# Patient Record
Sex: MALE | Race: BLACK OR AFRICAN AMERICAN | NOT HISPANIC OR LATINO | Employment: UNEMPLOYED | URBAN - METROPOLITAN AREA
[De-identification: names, ages, dates, MRNs, and addresses within clinical notes are randomized per-mention and may not be internally consistent; named-entity substitution may affect disease eponyms.]

---

## 2017-05-15 ENCOUNTER — HISTORICAL (OUTPATIENT)
Dept: RADIOLOGY | Facility: HOSPITAL | Age: 47
End: 2017-05-15

## 2017-09-26 ENCOUNTER — HISTORICAL (OUTPATIENT)
Dept: NEUROSURGERY | Facility: CLINIC | Age: 47
End: 2017-09-26

## 2017-09-26 ENCOUNTER — HISTORICAL (OUTPATIENT)
Dept: ADMINISTRATIVE | Facility: HOSPITAL | Age: 47
End: 2017-09-26

## 2017-09-26 LAB
ABS NEUT (OLG): 1.04 X10(3)/MCL (ref 2.1–9.2)
BUN SERPL-MCNC: 12 MG/DL (ref 7–18)
CALCIUM SERPL-MCNC: 9.3 MG/DL (ref 8.5–10.1)
CHLORIDE SERPL-SCNC: 101 MMOL/L (ref 98–107)
CO2 SERPL-SCNC: 28 MMOL/L (ref 21–32)
CREAT SERPL-MCNC: 1.11 MG/DL (ref 0.7–1.3)
ERYTHROCYTE [DISTWIDTH] IN BLOOD BY AUTOMATED COUNT: 14.8 % (ref 11.5–17)
GLUCOSE SERPL-MCNC: 100 MG/DL (ref 74–106)
HCT VFR BLD AUTO: 46.9 % (ref 42–52)
HGB BLD-MCNC: 15.2 GM/DL (ref 14–18)
INR PPP: 1.24 (ref 0–1.27)
LYMPHOCYTES NFR BLD MANUAL: 43 % (ref 13–40)
LYMPHOCYTES NFR BLD MANUAL: 8 %
MCH RBC QN AUTO: 27.5 PG (ref 27–31)
MCHC RBC AUTO-ENTMCNC: 32.4 GM/DL (ref 33–36)
MCV RBC AUTO: 85 FL (ref 80–94)
MONOCYTES NFR BLD MANUAL: 13 % (ref 2–11)
NEUTROPHILS NFR BLD MANUAL: 36 % (ref 47–80)
OVALOCYTES BLD QL SMEAR: 1
PLATELET # BLD AUTO: 167 X10(3)/MCL (ref 130–400)
PLATELET # BLD EST: NORMAL 10*3/UL
PMV BLD AUTO: 10.8 FL (ref 7.4–10.4)
POIKILOCYTOSIS BLD QL SMEAR: 1
POTASSIUM SERPL-SCNC: 4.4 MMOL/L (ref 3.5–5.1)
PROTHROMBIN TIME: 15.4 SECOND(S) (ref 12.2–14.7)
RBC # BLD AUTO: 5.52 X10(6)/MCL (ref 4.7–6.1)
RBC MORPH BLD: ABNORMAL
SCHISTOCYTES BLD QL AUTO: 1
SODIUM SERPL-SCNC: 139 MMOL/L (ref 136–145)
WBC # SPEC AUTO: 3 X10(3)/MCL (ref 4.5–11.5)

## 2017-10-10 ENCOUNTER — HISTORICAL (OUTPATIENT)
Dept: ADMINISTRATIVE | Facility: HOSPITAL | Age: 47
End: 2017-10-10

## 2017-10-10 ENCOUNTER — HISTORICAL (OUTPATIENT)
Dept: NEUROSURGERY | Facility: CLINIC | Age: 47
End: 2017-10-10

## 2017-10-10 LAB — APTT PPP: 32.8 SECOND(S) (ref 24.8–36.9)

## 2017-10-17 ENCOUNTER — HISTORICAL (OUTPATIENT)
Dept: ADMINISTRATIVE | Facility: HOSPITAL | Age: 47
End: 2017-10-17

## 2017-10-17 ENCOUNTER — HISTORICAL (OUTPATIENT)
Dept: NEUROSURGERY | Facility: CLINIC | Age: 47
End: 2017-10-17

## 2017-10-17 LAB
BUN SERPL-MCNC: 15 MG/DL (ref 7–18)
CALCIUM SERPL-MCNC: 9.1 MG/DL (ref 8.5–10.1)
CHLORIDE SERPL-SCNC: 102 MMOL/L (ref 98–107)
CO2 SERPL-SCNC: 27 MMOL/L (ref 21–32)
CREAT SERPL-MCNC: 1.05 MG/DL (ref 0.7–1.3)
GLUCOSE SERPL-MCNC: 91 MG/DL (ref 74–106)
POTASSIUM SERPL-SCNC: 4.3 MMOL/L (ref 3.5–5.1)
SODIUM SERPL-SCNC: 138 MMOL/L (ref 136–145)

## 2019-10-18 ENCOUNTER — HOSPITAL ENCOUNTER (OUTPATIENT)
Dept: NUTRITION | Facility: HOSPITAL | Age: 49
End: 2019-10-20
Attending: INTERNAL MEDICINE | Admitting: INTERNAL MEDICINE

## 2019-10-18 LAB
ABS NEUT (OLG): 2.87 X10(3)/MCL (ref 2.1–9.2)
ALBUMIN SERPL-MCNC: 3.8 GM/DL (ref 3.4–5)
ALBUMIN/GLOB SERPL: 1 RATIO (ref 1.1–2)
ALP SERPL-CCNC: 93 UNIT/L (ref 50–136)
ALT SERPL-CCNC: 32 UNIT/L (ref 12–78)
AST SERPL-CCNC: 24 UNIT/L (ref 15–37)
BASOPHILS # BLD AUTO: 0 X10(3)/MCL (ref 0–0.2)
BASOPHILS NFR BLD AUTO: 0 %
BILIRUB SERPL-MCNC: 1.1 MG/DL (ref 0.2–1)
BILIRUBIN DIRECT+TOT PNL SERPL-MCNC: 0.2 MG/DL (ref 0–0.5)
BILIRUBIN DIRECT+TOT PNL SERPL-MCNC: 0.9 MG/DL (ref 0–0.8)
BUN SERPL-MCNC: 41 MG/DL (ref 7–18)
CALCIUM SERPL-MCNC: 9.5 MG/DL (ref 8.5–10.1)
CHLORIDE SERPL-SCNC: 100 MMOL/L (ref 98–107)
CK SERPL-CCNC: 179 UNIT/L (ref 39–308)
CO2 SERPL-SCNC: 23 MMOL/L (ref 21–32)
CREAT SERPL-MCNC: 4.03 MG/DL (ref 0.7–1.3)
EOSINOPHIL # BLD AUTO: 0 X10(3)/MCL (ref 0–0.9)
EOSINOPHIL NFR BLD AUTO: 0 %
ERYTHROCYTE [DISTWIDTH] IN BLOOD BY AUTOMATED COUNT: 14 % (ref 11.5–17)
ETHANOL SERPL-MCNC: <3 MG/DL
GLOBULIN SER-MCNC: 3.8 GM/DL (ref 2.4–3.5)
GLUCOSE SERPL-MCNC: 138 MG/DL (ref 74–106)
HCT VFR BLD AUTO: 36 % (ref 42–52)
HGB BLD-MCNC: 11.2 GM/DL (ref 14–18)
LYMPHOCYTES # BLD AUTO: 0.6 X10(3)/MCL (ref 0.6–4.6)
LYMPHOCYTES NFR BLD AUTO: 15 %
MCH RBC QN AUTO: 27.7 PG (ref 27–31)
MCHC RBC AUTO-ENTMCNC: 31.1 GM/DL (ref 33–36)
MCV RBC AUTO: 88.9 FL (ref 80–94)
MONOCYTES # BLD AUTO: 0.5 X10(3)/MCL (ref 0.1–1.3)
MONOCYTES NFR BLD AUTO: 13 %
NEUTROPHILS # BLD AUTO: 2.87 X10(3)/MCL (ref 2.1–9.2)
NEUTROPHILS NFR BLD AUTO: 71 %
PLATELET # BLD AUTO: 129 X10(3)/MCL (ref 130–400)
PMV BLD AUTO: 11.4 FL (ref 9.4–12.4)
POTASSIUM SERPL-SCNC: 4.3 MMOL/L (ref 3.5–5.1)
PROT SERPL-MCNC: 7.6 GM/DL (ref 6.4–8.2)
RBC # BLD AUTO: 4.05 X10(6)/MCL (ref 4.7–6.1)
RESTICK: NORMAL
SODIUM SERPL-SCNC: 133 MMOL/L (ref 136–145)
TROPONIN I SERPL-MCNC: <0.02 NG/ML (ref 0.02–0.49)
TSH SERPL-ACNC: 1.27 MIU/L (ref 0.36–3.74)
WBC # SPEC AUTO: 4 X10(3)/MCL (ref 4.5–11.5)

## 2019-10-19 LAB
ABS NEUT (OLG): 2.42 X10(3)/MCL (ref 2.1–9.2)
AMPHET UR QL SCN: NEGATIVE
APPEARANCE, UA: CLEAR
BACTERIA SPEC CULT: ABNORMAL /HPF
BARBITURATE SCN PRESENT UR: NEGATIVE
BENZODIAZ UR QL SCN: NEGATIVE
BILIRUB UR QL STRIP: NEGATIVE
BUN SERPL-MCNC: 37 MG/DL (ref 7–18)
CALCIUM SERPL-MCNC: 9 MG/DL (ref 8.5–10.1)
CANNABINOIDS UR QL SCN: NEGATIVE
CHLORIDE SERPL-SCNC: 103 MMOL/L (ref 98–107)
CO2 SERPL-SCNC: 25 MMOL/L (ref 21–32)
COCAINE UR QL SCN: NEGATIVE
COLOR UR: YELLOW
CREAT SERPL-MCNC: 3.1 MG/DL (ref 0.7–1.3)
CREAT/UREA NIT SERPL: 11.9
EOSINOPHIL # BLD AUTO: 0 X10(3)/MCL (ref 0–0.9)
EOSINOPHIL NFR BLD AUTO: 1 %
ERYTHROCYTE [DISTWIDTH] IN BLOOD BY AUTOMATED COUNT: 13.8 % (ref 11.5–17)
GLUCOSE (UA): NEGATIVE
GLUCOSE SERPL-MCNC: 86 MG/DL (ref 74–106)
HCT VFR BLD AUTO: 30.8 % (ref 42–52)
HGB BLD-MCNC: 9.9 GM/DL (ref 14–18)
HGB UR QL STRIP: NEGATIVE
KETONES UR QL STRIP: ABNORMAL
LEUKOCYTE ESTERASE UR QL STRIP: NEGATIVE
LYMPHOCYTES # BLD AUTO: 0.9 X10(3)/MCL (ref 0.6–4.6)
LYMPHOCYTES NFR BLD AUTO: 24 %
MCH RBC QN AUTO: 27.7 PG (ref 27–31)
MCHC RBC AUTO-ENTMCNC: 32.1 GM/DL (ref 33–36)
MCV RBC AUTO: 86 FL (ref 80–94)
MONOCYTES # BLD AUTO: 0.5 X10(3)/MCL (ref 0.1–1.3)
MONOCYTES NFR BLD AUTO: 12 %
NEUTROPHILS # BLD AUTO: 2.42 X10(3)/MCL (ref 2.1–9.2)
NEUTROPHILS NFR BLD AUTO: 63 %
NITRITE UR QL STRIP: NEGATIVE
OPIATES UR QL SCN: NEGATIVE
PCP UR QL: NEGATIVE
PH UR STRIP.AUTO: 6 [PH] (ref 5–7.5)
PH UR STRIP: 6 [PH] (ref 5–9)
PLATELET # BLD AUTO: 157 X10(3)/MCL (ref 130–400)
PMV BLD AUTO: 11.5 FL (ref 9.4–12.4)
POTASSIUM SERPL-SCNC: 3.8 MMOL/L (ref 3.5–5.1)
PROT UR QL STRIP: NEGATIVE
RBC # BLD AUTO: 3.58 X10(6)/MCL (ref 4.7–6.1)
RBC #/AREA URNS HPF: ABNORMAL /[HPF]
SODIUM SERPL-SCNC: 138 MMOL/L (ref 136–145)
SP GR FLD REFRACTOMETRY: 1.01 (ref 1–1.03)
SP GR UR STRIP: 1.01 (ref 1–1.03)
SQUAMOUS EPITHELIAL, UA: ABNORMAL
UROBILINOGEN UR STRIP-ACNC: 0.2
WBC # SPEC AUTO: 3.8 X10(3)/MCL (ref 4.5–11.5)
WBC #/AREA URNS HPF: ABNORMAL /HPF

## 2019-10-20 LAB
BUN SERPL-MCNC: 22 MG/DL (ref 7–18)
CALCIUM SERPL-MCNC: 8.2 MG/DL (ref 8.5–10.1)
CHLORIDE SERPL-SCNC: 110 MMOL/L (ref 98–107)
CO2 SERPL-SCNC: 24 MMOL/L (ref 21–32)
CREAT SERPL-MCNC: 1.92 MG/DL (ref 0.7–1.3)
CREAT/UREA NIT SERPL: 11.5
GLUCOSE SERPL-MCNC: 90 MG/DL (ref 74–106)
POTASSIUM SERPL-SCNC: 3.8 MMOL/L (ref 3.5–5.1)
SODIUM SERPL-SCNC: 141 MMOL/L (ref 136–145)

## 2020-12-29 ENCOUNTER — HISTORICAL (OUTPATIENT)
Dept: RADIOLOGY | Facility: HOSPITAL | Age: 50
End: 2020-12-29

## 2021-09-27 ENCOUNTER — HOSPITAL ENCOUNTER (OUTPATIENT)
Dept: NUTRITION | Facility: HOSPITAL | Age: 51
End: 2021-09-29
Attending: INTERNAL MEDICINE | Admitting: INTERNAL MEDICINE

## 2021-09-27 LAB
ABS NEUT (OLG): 2.34 X10(3)/MCL (ref 2.1–9.2)
ALBUMIN SERPL-MCNC: 3.9 GM/DL (ref 3.5–5)
ALBUMIN/GLOB SERPL: 1.3 RATIO (ref 1.1–2)
ALP SERPL-CCNC: 78 UNIT/L (ref 40–150)
ALT SERPL-CCNC: 37 UNIT/L (ref 0–55)
APPEARANCE, UA: CLEAR
AST SERPL-CCNC: 28 UNIT/L (ref 5–34)
BACTERIA SPEC CULT: NORMAL /HPF
BASOPHILS # BLD AUTO: 0 X10(3)/MCL (ref 0–0.2)
BASOPHILS NFR BLD AUTO: 0 %
BILIRUB SERPL-MCNC: 0.7 MG/DL
BILIRUB UR QL STRIP: NEGATIVE
BILIRUBIN DIRECT+TOT PNL SERPL-MCNC: 0.3 MG/DL (ref 0–0.5)
BILIRUBIN DIRECT+TOT PNL SERPL-MCNC: 0.4 MG/DL (ref 0–0.8)
BUN SERPL-MCNC: 9.2 MG/DL (ref 8.4–25.7)
BUN SERPL-MCNC: 9.4 MG/DL (ref 8.4–25.7)
CALCIUM SERPL-MCNC: 9 MG/DL (ref 8.4–10.2)
CALCIUM SERPL-MCNC: 9.5 MG/DL (ref 8.4–10.2)
CHLORIDE SERPL-SCNC: 89 MMOL/L (ref 98–107)
CHLORIDE SERPL-SCNC: 94 MMOL/L (ref 98–107)
CO2 SERPL-SCNC: 22 MMOL/L (ref 22–29)
CO2 SERPL-SCNC: 25 MMOL/L (ref 22–29)
COLOR UR: YELLOW
CREAT SERPL-MCNC: 0.9 MG/DL (ref 0.73–1.18)
CREAT SERPL-MCNC: 1.2 MG/DL (ref 0.73–1.18)
CREAT/UREA NIT SERPL: 10
EOSINOPHIL # BLD AUTO: 0 X10(3)/MCL (ref 0–0.9)
EOSINOPHIL NFR BLD AUTO: 0 %
ERYTHROCYTE [DISTWIDTH] IN BLOOD BY AUTOMATED COUNT: 14.4 % (ref 11.5–17)
GLOBULIN SER-MCNC: 3.1 GM/DL (ref 2.4–3.5)
GLUCOSE (UA): NEGATIVE
GLUCOSE SERPL-MCNC: 118 MG/DL (ref 74–100)
GLUCOSE SERPL-MCNC: 96 MG/DL (ref 74–100)
HCT VFR BLD AUTO: 32.2 % (ref 42–52)
HGB BLD-MCNC: 10.5 GM/DL (ref 14–18)
HGB UR QL STRIP: NEGATIVE
KETONES UR QL STRIP: NEGATIVE
LEUKOCYTE ESTERASE UR QL STRIP: NEGATIVE
LYMPHOCYTES # BLD AUTO: 0.7 X10(3)/MCL (ref 0.6–4.6)
LYMPHOCYTES NFR BLD AUTO: 20 %
MCH RBC QN AUTO: 26.6 PG (ref 27–31)
MCHC RBC AUTO-ENTMCNC: 32.6 GM/DL (ref 33–36)
MCV RBC AUTO: 81.7 FL (ref 80–94)
MONOCYTES # BLD AUTO: 0.3 X10(3)/MCL (ref 0.1–1.3)
MONOCYTES NFR BLD AUTO: 10 %
NEUTROPHILS # BLD AUTO: 2.34 X10(3)/MCL (ref 2.1–9.2)
NEUTROPHILS NFR BLD AUTO: 69 %
NITRITE UR QL STRIP: NEGATIVE
OSMOLALITY SERPL: 259 MOSM/KG (ref 280–300)
OSMOLALITY UR: 144 MOSM/KG (ref 300–1300)
PH UR STRIP: 7 [PH] (ref 5–9)
PLATELET # BLD AUTO: 175 X10(3)/MCL (ref 130–400)
PMV BLD AUTO: 11.5 FL (ref 9.4–12.4)
POTASSIUM SERPL-SCNC: 3.3 MMOL/L (ref 3.5–5.1)
POTASSIUM SERPL-SCNC: 3.8 MMOL/L (ref 3.5–5.1)
PROT SERPL-MCNC: 7 GM/DL (ref 6.4–8.3)
PROT UR QL STRIP: NEGATIVE
RBC # BLD AUTO: 3.94 X10(6)/MCL (ref 4.7–6.1)
RBC #/AREA URNS HPF: NORMAL /[HPF]
SARS-COV-2 AG RESP QL IA.RAPID: NEGATIVE
SODIUM SERPL-SCNC: 125 MMOL/L (ref 136–145)
SODIUM SERPL-SCNC: 129 MMOL/L (ref 136–145)
SODIUM SERPL-SCNC: 130 MMOL/L (ref 136–145)
SODIUM UR-SCNC: 28 MMOL/L
SP GR UR STRIP: 1 (ref 1–1.03)
SQUAMOUS EPITHELIAL, UA: NORMAL /HPF (ref 0–4)
UROBILINOGEN UR STRIP-ACNC: 1
WBC # SPEC AUTO: 3.4 X10(3)/MCL (ref 4.5–11.5)
WBC #/AREA URNS HPF: NORMAL /HPF

## 2021-09-28 LAB
BUN SERPL-MCNC: 9.4 MG/DL (ref 8.4–25.7)
CALCIUM SERPL-MCNC: 9.7 MG/DL (ref 8.4–10.2)
CHLORIDE SERPL-SCNC: 97 MMOL/L (ref 98–107)
CO2 SERPL-SCNC: 27 MMOL/L (ref 22–29)
CREAT SERPL-MCNC: 1.22 MG/DL (ref 0.73–1.18)
CREAT/UREA NIT SERPL: 8
ERYTHROCYTE [DISTWIDTH] IN BLOOD BY AUTOMATED COUNT: 14.7 % (ref 11.5–17)
GLUCOSE SERPL-MCNC: 111 MG/DL (ref 74–100)
HCT VFR BLD AUTO: 30.2 % (ref 42–52)
HGB BLD-MCNC: 10 GM/DL (ref 14–18)
MCH RBC QN AUTO: 26.7 PG (ref 27–31)
MCHC RBC AUTO-ENTMCNC: 33.1 GM/DL (ref 33–36)
MCV RBC AUTO: 80.7 FL (ref 80–94)
PLATELET # BLD AUTO: 198 X10(3)/MCL (ref 130–400)
PMV BLD AUTO: 11.4 FL (ref 9.4–12.4)
POTASSIUM SERPL-SCNC: 4.8 MMOL/L (ref 3.5–5.1)
RBC # BLD AUTO: 3.74 X10(6)/MCL (ref 4.7–6.1)
SODIUM SERPL-SCNC: 133 MMOL/L (ref 136–145)
WBC # SPEC AUTO: 3.4 X10(3)/MCL (ref 4.5–11.5)

## 2021-09-29 LAB
BUN SERPL-MCNC: 8.8 MG/DL (ref 8.4–25.7)
CALCIUM SERPL-MCNC: 9.9 MG/DL (ref 8.4–10.2)
CHLORIDE SERPL-SCNC: 98 MMOL/L (ref 98–107)
CO2 SERPL-SCNC: 25 MMOL/L (ref 22–29)
CREAT SERPL-MCNC: 1.06 MG/DL (ref 0.73–1.18)
CREAT/UREA NIT SERPL: 8
GLUCOSE SERPL-MCNC: 111 MG/DL (ref 74–100)
POTASSIUM SERPL-SCNC: 4.5 MMOL/L (ref 3.5–5.1)
SODIUM SERPL-SCNC: 136 MMOL/L (ref 136–145)

## 2021-11-18 ENCOUNTER — HOSPITAL ENCOUNTER (OUTPATIENT)
Dept: TELEMEDICINE | Facility: HOSPITAL | Age: 51
Discharge: HOME | End: 2021-11-18

## 2021-11-18 DIAGNOSIS — G93.40 ENCEPHALOPATHY: ICD-10-CM

## 2021-11-18 PROCEDURE — G0425 INPT/ED TELECONSULT30: HCPCS | Mod: GT,,, | Performed by: PSYCHIATRY & NEUROLOGY

## 2021-11-18 PROCEDURE — G0425 PR INPT TELEHEALTH CONSULT 30M: ICD-10-PCS | Mod: GT,,, | Performed by: PSYCHIATRY & NEUROLOGY

## 2022-03-11 ENCOUNTER — HISTORICAL (OUTPATIENT)
Dept: ADMINISTRATIVE | Facility: HOSPITAL | Age: 52
End: 2022-03-11

## 2022-03-14 ENCOUNTER — HISTORICAL (OUTPATIENT)
Dept: ADMINISTRATIVE | Facility: HOSPITAL | Age: 52
End: 2022-03-14

## 2022-03-14 LAB — SARS-COV-2 AG RESP QL IA.RAPID: NEGATIVE

## 2022-03-16 ENCOUNTER — HISTORICAL (OUTPATIENT)
Dept: SURGERY | Facility: HOSPITAL | Age: 52
End: 2022-03-16

## 2022-04-11 ENCOUNTER — HISTORICAL (OUTPATIENT)
Dept: ADMINISTRATIVE | Facility: HOSPITAL | Age: 52
End: 2022-04-11
Payer: MEDICARE

## 2022-04-25 VITALS
HEIGHT: 68 IN | DIASTOLIC BLOOD PRESSURE: 90 MMHG | BODY MASS INDEX: 26.66 KG/M2 | WEIGHT: 175.94 LBS | SYSTOLIC BLOOD PRESSURE: 128 MMHG | OXYGEN SATURATION: 100 %

## 2022-04-30 NOTE — OP NOTE
DATE OF SURGERY:    09/26/2017    SURGEON:  Ramesh Stanley MD    PREOPERATIVE DIAGNOSIS:  Cervicalgia, dystonia.    POSTOPERATIVE DIAGNOSIS:  Cervicalgia, dystonia.    PROCEDURE:  Fluoroscopically guided intralaminar cervical epidural injection at C3-4.        Equipment used:  Epidural tray.    PROCEDURE IN DETAIL:  Following informed consent, and with the patient under general anesthesia, he was prepped and draped in the usual sterile fashion.  I infiltrated the tissue overlying C3-4 with local anesthetic.  Under fluoroscopic guidance, I advanced a 25-gauge 3.5 inch BD spinal needle into the epidural space.  Positioning was confirmed with administration of contrast.  I then instilled a combination of 160 mg Depo-Medrol and 1 mL of 5% dextrose in water.  I removed the needle and applied a sterile dressing.  The patient tolerated the procedure well without apparent complication.    IMPRESSION:  Successful fluoroscopically guided intralaminar cervical epidural injection at C3-4.        ______________________________  MD SABRINA Young/ASAD  DD:  09/26/2017  Time:  08:48AM  DT:  09/26/2017  Time:  12:07PM  Job #:  [id number]

## 2022-04-30 NOTE — ED PROVIDER NOTES
Patient:   Larry Riggs            MRN: [id number]            FIN: [id number]               Age:   48 years     Sex:  Male     :  1970   Associated Diagnoses:   Syncope; Dehydration; Acute hypotension   Author:   Tiburcio Bro MD      Basic Information   Time seen: Date & time 10/18/2019 18:30:00.   History source: Patient.   Arrival mode: Ambulance.   History limitation: None.   Provider/Visit info:    No qualifying data available.   .   History of Present Illness   The patient presents with 49Y/O M presents to the ED with syncope found on the ground outside. Hypotensive on scene. C-collar in place upon arrival .  Kaye GREENE and     I, Dr. Bro, assumed care of this patient at 1838.    49 y/o AAM presents to the ED via EMS after suffering a syncopal episode PTA. Pt reports that the last thing that he remembers prior to falling was walking outside and suddenly becoming lightheaded. He states that he had a previous episode 5 years ago. Pt states that he felt fine prior to the fall and denies any HA, neck pain, or fever. Currently on HTN medication. Hypotensive on scene. .  The onset was just prior to arrival.  The course/duration of symptoms is constant.  The location where the incident occurred was at home.  The exacerbating factor is none.  The relieving factor is none.  Risk factors consist of hypertension.  Prior episodes: Previous episode five years ago.  Therapy today: emergency medical services.  Preceding symptoms: lightheaded.  Associated symptoms: denies headache.  Associated injury to the none.  Additional history: none.        Review of Systems   Constitutional symptoms:  No fever, no chills, no sweats, no weakness, no fatigue, no decreased activity.    Skin symptoms:  No jaundice, no rash, no pruritus, no abrasions, no breakdown, no burns, no dryness, no petechiae, no lesion.    ENMT symptoms:  No ear pain, no sore throat, no nasal congestion, no sinus pain.     Respiratory symptoms:  No shortness of breath, no orthopnea, no cough, no hemoptysis, no stridor, no wheezing.    Cardiovascular symptoms:  Syncope, no chest pain, no palpitations, no tachycardia, no diaphoresis, no peripheral edema.    Gastrointestinal symptoms:  No abdominal pain, no nausea, no vomiting.    Genitourinary symptoms:  No dysuria, no hematuria.    Musculoskeletal symptoms:  Denies neck pain, no back pain, no Muscle pain, no Joint pain, no Claudication.    Neurologic symptoms:  No headache, no dizziness, no altered level of consciousness, no numbness, no tingling, no weakness.              Additional review of systems information: All systems reviewed as documented in chart.      Health Status   Allergies:    Allergic Reactions (Selected)  No Known Allergies.   Medications: Per nurse's notes.      Past Medical/ Family/ Social History   Medical history:    Active  Anxiety ([id number]): Onset on 10/29/2013 at 42 years.  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  bulging cervical disc ([id number]):  Resolved.  Hypercholesterolemia (33793045):  Resolved.  Hypertension (83339700):  Resolved.  Acid reflux ([id number]):  Resolved.  Depression (457154405):  Resolved..   Surgical history:    Injection Cervical Epidural Steroid (.) on 10/17/2017 at 46 Years.  Comments:  10/17/2017 10:33 Jammie Schulz  auto-populated from documented surgical case  Injection Cervical Epidural Steroid (., Posterior) on 10/10/2017 at 46 Years.  Comments:  10/10/2017 9:05 Briseyda Craft RN  auto-populated from documented surgical case  Injection Cervical Epidural Steroid (.) on 9/26/2017 at 46 Years.  Comments:  9/26/2017 9:09 Jammie Schulz  auto-populated from documented surgical case  left knee ACL reconstruction.  right ankle surgery.  Cervical vertebral fusion ([id number]).  colonoscopy - 11/2015..   Family history:    Hypertension  Mother  Father  Diabetes mellitus type 1  Mother  .    Social history: Alcohol use: Quit 1.5 years ago, Tobacco use: Denies, Drug use: Denies, Occupation: On disability.      Physical Examination               Vital Signs   Vital Signs   10/18/2019 18:35 CDT     Peripheral Pulse Rate     82 bpm                             Respiratory Rate          18 br/min                             SpO2                      100 %                             Oxygen Therapy            Room air                             Systolic Blood Pressure   71 mmHg  LOW                             Diastolic Blood Pressure  49 mmHg  LOW                             Mean Arterial Pressure, Cuff              56 mmHg  .   Measurements   10/18/2019 18:29 CDT     Weight Dosing             81.7 kg                             Weight Measured and Calculated in Lbs     180.12 lb                             Weight Estimated          81.7 kg                             Height/Length Dosing      172 cm                             Height/Length Estimated   172 cm                             Body Mass Index Estimated 27.62 kg/m2  .   Basic Oxygen Information   10/18/2019 18:35 CDT     SpO2                      100 %                             Oxygen Therapy            Room air  .   General:  Alert, no acute distress.    Skin:  Warm, dry, no rash.    Head:  Normocephalic, atraumatic.    Neck:  Supple, trachea midline, no tenderness, C-collar per EMS that was taken off in room.    Eye:  Extraocular movements are intact, vision unchanged.    Ears, nose, mouth and throat:  Dry oral mucosa.   Respiratory:  Lungs are clear to auscultation, respirations are non-labored, breath sounds are equal, Symmetrical chest wall expansion.    Cardiovascular:  Regular rate and rhythm, No murmur, Normal peripheral perfusion.    Gastrointestinal:  Soft, Nontender, Non distended, Normal bowel sounds, No organomegaly.    Musculoskeletal:  Normal ROM, normal strength.    Neurological:  Alert and oriented to person, place, time, and  situation, No focal neurological deficit observed, normal sensory observed, normal motor observed, normal speech observed, normal coordination observed.    Psychiatric:  Cooperative, appropriate mood & affect.       Medical Decision Making   Documents reviewed:  Emergency department nurses' notes.   Orders  Launch Orders   Laboratory:  Troponin-I (Order): Stat collect, 10/18/2019 18:31 CDT, Blood, Lab Collect, Print Label By Order Location, 10/18/2019 18:31 CDT  UA Total a reflex to culture (Order): Stat collect, Urine, 10/18/2019 18:31 CDT, Nurse collect, Print Label By Order Location  CMP (Order): Stat collect, 10/18/2019 18:31 CDT, Blood, Lab Collect, Print Label By Order Location, 10/18/2019 18:31 CDT  CBC w/ Auto Diff (Order): Now collect, 10/18/2019 18:31 CDT, Blood, Lab Collect, Print Label By Order Location, 10/18/2019 18:31 CDT  Pharmacy:  Normal Saline (0.9% NS) IV 1,000 mL (Order): 1,000 mL, 1,000 mL, IV, 999 mL/hr, start date 10/18/2019 18:31 CDT  Radiology:  CT Cervical Spine W/O Contrast (Order): Stat, 10/18/2019 18:31 CDT, Other (please specify), FALL, None, Patient Bed, Patient Has IV?, Rad Type, Schedule this test  CT Head W/O Contrast (Order): Stat, 10/18/2019 18:31 CDT, Other (please specify), SYNCOPE, None, Patient Bed, Patient Has IV?, Rad Type, Schedule this test  Cardiology:  EKG (Order): 10/18/2019 18:31 CDT, Stat, Once, 10/18/2019 18:31 CDT, Patient Bed, Patient Has IV, Standard Precautions, -1, -1, 10/18/2019 18:31 CDT.   Electrocardiogram:  Time 10/18/2019 21:31:00, rate 83, normal sinus rhythm, no ectopy, normal AR & QRS intervals, EP Interp, T wave Flattened.    Results review:  Lab results : Lab View   10/18/2019 19:10 CDT     Sodium Lvl                133 mmol/L  LOW                             Potassium Lvl             4.3 mmol/L                             Chloride                  100 mmol/L                             CO2                       23.0 mmol/L                              Calcium Lvl               9.5 mg/dL                             Glucose Lvl               138 mg/dL  HI                             BUN                       41.0 mg/dL  HI                             Creatinine                4.03 mg/dL  HI                             eGFR-AA                   20 mL/min/1.73 m2  NA                             eGFR-REINALDO                  17 mL/min/1.73 m2  NA                             Bili Total                1.1 mg/dL  HI                             Bili Direct               0.20 mg/dL                             Bili Indirect             0.90 mg/dL  HI                             AST                       24 unit/L                             ALT                       32 unit/L                             Alk Phos                  93 unit/L                             Total Protein             7.6 gm/dL                             Albumin Lvl               3.80 gm/dL                             Globulin                  3.80 gm/dL  HI                             A/G Ratio                 1.0 ratio  LOW                             Troponin-I                <0.02 ng/mL                             TSH                       1.270 mIU/L                             WBC                       4.0 x10(3)/mcL  LOW                             RBC                       4.05 x10(6)/mcL  LOW                             Hgb                       11.2 gm/dL  LOW                             Hct                       36.0 %  LOW                             Platelet                  129 x10(3)/mcL  LOW                             MCV                       88.9 fL                             MCH                       27.7 pg                             MCHC                      31.1 gm/dL  LOW                             RDW                       14.0 %                             MPV                       11.4 fL                             Abs Neut                  2.87 x10(3)/mcL                              Neutro Auto               71 %  NA                             Lymph Auto                15 %  NA                             Mono Auto                 13 %  NA                             Eos Auto                  0 %  NA                             Abs Eos                   0.0 x10(3)/mcL                             Basophil Auto             0 %  NA                             Abs Neutro                2.87 x10(3)/mcL                             Abs Lymph                 0.6 x10(3)/mcL                             Abs Mono                  0.5 x10(3)/mcL                             Abs Baso                  0.0 x10(3)/mcL                             Ethanol Lvl               <3.0 mg/dL  NA  .   Radiology results:  Emergency physician interpretation: No acute changes seen on chest x-ray, Rad Results (ST)  < 12 hrs   Accession: [id number]  Order: CT Cervical Spine W/O Contrast  Report Dt/Tm: 10/18/2019 20:39  Report:   EXAMINATION: CT of the head and cervical spine without contrast     EXAMINATION DATE: 10/18/2019     COMPARISON: 3/28/2019     TECHNIQUE: Multiple cross-sectional images of the head and cervical  spine were obtained without the use of contrast. Coronal and sagittal  reformatted images were obtained.     CLINICAL HISTORY: Fall     FINDINGS:     Head:     No acute ischemic changes or infarct. No intraparenchymal hemorrhage  or contusion. No mass, mass effect, midline shift, edema, or  extra-axial fluid. The gray-white matter differentiation maintained.  The cerebral sulci and basal cisterns are normal. No hydrocephalus.  Hypodensities the periventricular deep cortical white matter  consistent with nonspecific small vessel ischemia.     The globes are intact. The paranasal sinuses and mastoid air cells are  well pneumatized.     Cervical spine:     Straightening of the normal lordotic curvature. The alignment is  normal. Status post anterior fusion at C4-C5 and C5-C6. No definitive  osseous  union is identified. There is been prior discectomy at C4-C5  and C5-C6. The vertebral heights are maintained. No compression  deformity, fracture, or subluxation. No significant intervertebral  disc space narrowing is identified excluded at C6-C7. The predental  space and prevertebral soft tissues are normal. Minimal central canal  stenosis is identified C4-C5 and C5-C6 as well as C6-C7. Uncovertebral  facet arthropathy is noted without significant neural foraminal  narrowing.     The visualized soft tissues of the neck are normal. The lung apices  are clear.     IMPRESSION:    1. No acute intracranial adenopathy. Findings consistent with  microangiopathy.  2. Spondylotic changes of the cervical spine as above without  fracture. Status post fusion at C4-C5 and C5-C6.      .       Reexamination/ Reevaluation   Time: 10/18/2019 21:27:00 .   Vital signs   results included from flowsheet : Vital Signs   10/18/2019 20:30 CDT     Peripheral Pulse Rate     79 bpm                             Heart Rate Monitored      79 bpm                             Respiratory Rate          13 br/min                             SpO2                      99 %                             Oxygen Therapy            Room air                             Systolic Blood Pressure   100 mmHg                             Diastolic Blood Pressure  71 mmHg                             Mean Arterial Pressure, Cuff              81 mmHg     Course: progressing as expected, well controlled, Patient is alert and oriented, he is in no apparent distress.  No complaints.  Patient states he is feeling better..      Procedure   Critical care note   Total time: 45 minutes spent engaged in work directly related to patient care and/ or available for direct patient care.   Critical condition(s) addressed for impending deterioration include: cardiovascular, metabolic, renal.   Associated risk factors: hypotension, dysrhythmia, metabolic changes, dehydration.    Management: bedside assessment, supervision of care, Interpretation (chest x-ray, electrocardiogram, blood pressure, cardiac output measures), Interventions hemodynamic management, Case review primary care physician.   Treatment response: Blood pressure is improving after fluid bolus.  Patient states he is feeling better..   Performed by: self.      Impression and Plan   Diagnosis   Syncope (PUW19-BR R55)   Dehydration (VJE50-YI E86.0)   Acute hypotension (GRK33-OR I95.9)      Calls-Consults   -  10/18/2019 21:29:00 , Fernanda GIRALDO.    Plan   Condition: Stable.    Disposition: Admit time  10/18/2019 21:29:00.    Counseled: Patient, Regarding diagnosis, Regarding diagnostic results, Regarding treatment plan, Patient indicated understanding of instructions.    Notes: I, Yousif Lopez, acted solely as a scribe for and in the presence of Dr. Bro who performed the service. , I acknowledged that the documentation on this chart was provided by a scribe on the date of service noted above and that the documentation in the chart accurately reflects work and decisions made by me alone..

## 2022-04-30 NOTE — OP NOTE
DATE OF SURGERY:    10/17/2017    SURGEON:  Ramesh Stanley MD    PREOPERATIVE DIAGNOSIS:  Cervical radiculopathy.    POSTOPERATIVE DIAGNOSIS:  Cervical radiculopathy.    PROCEDURE:  Fluoroscopically guided interlaminar cervical epidural injection at C3-4.    EQUIPMENT USED:  Epidural tray.    DETAILED DESCRIPTION:  Following informed consent, the patient was prepped and draped in the usual sterile fashion.  I infiltrated the tissue overlying C3-4 with local anesthetic.  Under fluoroscopic guidance, I advanced a 25 gauge 3.5 inch BD spinal needle into the epidural space.  Positioning was confirmed with administration of contrast.  I then instilled a combination of 160 mg Depo-Medrol and 1 ml of 5% dextrose in water.  I removed the needle and applied sterile dressing.  The patient tolerated the procedure well without apparent complication.    IMPRESSION:  Successful fluoroscopically guided interlaminar cervical epidural injection at C3-4.        ______________________________  MD SABRINA Young/LEVI  DD:  10/17/2017  Time:  09:20AM  DT:  10/17/2017  Time:  02:15PM  Job #:  [id number]

## 2022-04-30 NOTE — ED PROVIDER NOTES
Patient:   Larry Riggs            MRN: [id number]            FIN: [id number]               Age:   50 years     Sex:  Male     :  1970   Associated Diagnoses:   Generalized weakness; Acute hyponatremia   Author:   Michelle Moore MD      Basic Information   Time seen: Date & time 2021 09:12:00.   History source: Patient, EMS.   Arrival mode: Ambulance.   History limitation: None.   Additional information: Chief Complaint from Nursing Triage Note : Chief Complaint   2021 9:08 CDT       Chief Complaint           Dizziness and confusion x3 days. [facility]. Na: 119 on labs 3 days ago.  .      History of Present Illness   The patient presents with 50 year old male presents to ED, via EMS, complaining of dizziness.  Pt was seen at [facility] today and then sent to ED.  He reports feeling dizzy and lightheaded for the last few days, and had a fall last week.  Pt also reports losing about 10 pounds without trying recently, because he has no appetite.  Pt says that he did begin a new medication recently, but he can't remember what it is.  Pt denies any fever, CP, SOB, N/V..  The onset was 3  days ago.  The course/duration of symptoms is constant.  The character of symptoms is lightheaded.  The degree at present is moderate.  The exacerbating factor is none.  The relieving factor is none.  Therapy today: see nurses notes.  Associated symptoms: denies chest pain, denies nausea, denies vomiting and denies shortness of breath.        Review of Systems   Constitutional symptoms:  No fever, no chills, no sweatsWeight loss: 10 pounds.   Skin symptoms:  No rash, no petechiae.    Eye symptoms:  Vision unchangedNo pain, no discharge, no icterus, no diplopia, no blurred vision, no blindness.    ENMT symptoms:  No ear pain, no sore throat, no nasal congestion, no sinus pain.    Respiratory symptoms:  No shortness of breath, no orthopnea, no cough, no hemoptysis, no stridor,  no wheezing.    Cardiovascular symptoms:  No chest pain, no palpitations, no syncope, no diaphoresis, no peripheral edema.    Gastrointestinal symptoms:  No abdominal pain, no nausea, no vomiting, no diarrhea, no constipation, no rectal bleeding, no rectal pain.    Genitourinary symptoms:  No dysuria, no hematuria   Musculoskeletal symptoms:  No back pain, no Muscle pain   Neurologic symptoms:  DizzinessNo headache, no altered level of consciousness, no numbness, no tingling.    Psychiatric symptoms:  Negative except as documented in HPI   Endocrine symptoms:  Negative except as documented in HPI.   Hematologic/Lymphatic symptoms:  Negative except as documented in HPI      Health Status   Allergies:    Allergic Reactions (All)  No Known Allergies.   Medications:  (Selected)   Prescriptions  Prescribed  aspirin 81 mg oral tablet: 162 mg = 2 tab(s), Oral, Daily, # 90 tab(s), 0 Refill(s), Pharmacy: [facility]/pharmacy #[id number]  Documented Medications  Documented  PARoxetine 30 mg oral tablet: 60 mg = 2 tab(s), Oral, Daily, # 60 tab(s), 0 Refill(s)  QUEtiapine 100 mg oral tablet: 100 mg = 1 tab(s), Oral, qPM  atorvastatin 80 mg oral tablet: 40 mg = 0.5 tab(s), Oral, Daily, 0 Refill(s)  benztropine 2 mg oral tablet: 2 mg = 1 tab(s), Oral, BID, # 120 tab(s), 0 Refill(s)  busPIRone 10 mg oral tablet: 10 mg = 1 tab(s), Oral, TID  cholecalciferol 2000 intl units oral capsule: 2,000 IntUnit = 1 cap(s), Oral, Daily, 0 Refill(s)  cyproheptadine 4 mg oral tablet: 8 mg = 2 tab(s), Oral, BID, 0 Refill(s)  escitalopram 20 mg oral tablet: 20 mg = 1 tab(s), Oral, Daily, # 30 tab(s), 0 Refill(s)  hydrochlorothiazide-lisinopril 25 mg-20 mg oral tablet: 1 tab(s), Oral, Daily  perphenazine 4 mg oral tablet: 4 mg = 1 tab(s), Oral, BID, 0 Refill(s)  polyethylene glycol 3350 ( MiraLax ): 17 gm, Oral, Daily, dissolve in water before taking, 0 Refill(s)  sildenafil 100 mg oral tablet: 100 mg = 1 tab(s), Oral, Daily, PRN PRN erectile dysfunction, 0  Refill(s)  traZODone 100 mg oral tablet: 100 mg = 1 tab(s), Oral, BID, # 60 tab(s), 0 Refill(s).      Past Medical/ Family/ Social History   Medical history:    Active  Anxiety (30022818): Onset on 10/29/2013 at 42 years.  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  bulging cervical disc (7122235375):  Resolved.  Hypercholesterolemia (18350136):  Resolved.  Hypertension (39251080):  Resolved.  Acid reflux (108269177):  Resolved.  Depression (866252494):  Resolved..   Surgical history:    Injection Cervical Epidural Steroid (.) on 10/17/2017 at 46 Years.  Comments:  10/17/2017 10:33 Jammie Schulz  auto-populated from documented surgical case  Injection Cervical Epidural Steroid (., Posterior) on 10/10/2017 at 46 Years.  Comments:  10/10/2017 9:05 Briseyda Craft RN  auto-populated from documented surgical case  Injection Cervical Epidural Steroid (.) on 9/26/2017 at 46 Years.  Comments:  9/26/2017 9:09 Jammie Schulz  auto-populated from documented surgical case  left knee ACL reconstruction.  right ankle surgery.  Cervical vertebral fusion ([id number]).  colonoscopy - 11/2015..   Family history:    Hypertension  Mother  Father  Diabetes mellitus type 1  Mother  .   Social history:    Social & Psychosocial Habits    Alcohol    Comment: quit 1.5 yrs ago - 08/03/2012 08:27 - Hernan MACHUCA, Suzanna WOMACK    Employment/School  10/29/2014  Status: Disabled    Substance Use  08/27/2018  Use: Past    Tobacco  11/12/2018  Use: Former smoker    Patient Wants Consult For Cessation Counseling N/A    10/18/2019  Use: Former smoker, quit more    Type: Cigarettes    Patient Wants Consult For Cessation Counseling N/A    Started at age: 12 Years    Stopped at age: 38 Years    06/15/2020  Use: Never (less than 100 in l    Patient Wants Consult For Cessation Counseling N/A    Abuse/Neglect  10/18/2019  SHX Any signs of abuse or neglect No    Feels unsafe at home: No    Safe place to go:  Yes    06/15/2020  SHX Any signs of abuse or neglect No  , Occupation: Retired, Family/social situation: Unmarried.   Problem list:    Active Problems (6)  Anxiety   Cervicalgia   Depression   High cholesterol   HTN - Hypertension   Obesity   .      Physical Examination               Vital Signs   Vital Signs   9/27/2021 9:08 CDT       Temperature Oral          36.6 DegC                             Temperature Oral (calculated)             97.88 DegF                             Peripheral Pulse Rate     120 bpm  HI                             Respiratory Rate          20 br/min                             SpO2                      100 %                             Oxygen Therapy            Room air                             Systolic Blood Pressure   105 mmHg                             Diastolic Blood Pressure  81 mmHg  .   Measurements   9/27/2021 9:08 CDT       Weight Dosing             81.6 kg                             Weight Measured and Calculated in Lbs     179.90 lb                             Weight Estimated          81.6 kg                             Height/Length Dosing      172 cm                             Height/Length Estimated   172 cm                             Body Mass Index Estimated 27.58 kg/m2  .   Basic Oxygen Information   9/27/2021 9:08 CDT       SpO2                      100 %                             Oxygen Therapy            Room air  .   General:  Alert, no acute distress   Skin:  Warm, dry, intact   Head:  Normocephalic, atraumatic   Neck:  Supple, trachea midline, no tenderness   Eye:  Pupils are equal, round and reactive to light, extraocular movements are intact   Cardiovascular:  No murmur, Normal peripheral perfusion, Tachycardia   Respiratory:  Lungs are clear to auscultation, respirations are non-labored   Back:  Nontender, Normal range of motion   Musculoskeletal:  Normal ROM, normal strength   Gastrointestinal:  Soft, Nontender   Neurological:  Alert and oriented to  person, place, time, and situation, No focal neurological deficit observed   Psychiatric:  Cooperative, appropriate mood & affect      Medical Decision Making   Differential Diagnosis:  Dizziness, vertigo, generalized weakness, syncope, dysrhythmia, hypertension, hypotension, hyperglycemia, hypoglycemia, dehydration, upper respiratory infection, anemia.    Rationale:  pt here with hyponatremia, symptomatic, worse a couple days ago but still low, given ivf, admit.   Documents reviewed:  Emergency department nurses' notes, prior records.    Orders  Launch Order Profile (Selected)   Inpatient Orders  Ordered  Cardiac Monitorin21 9:17:00 CDT, Constant Order  EK21 9:17:00 CDT, Stat, Once, 21 9:17:00 CDT, Patient Bed, Patient Has IV, Standard Precautions, -1, -1, 21 9:17:00 CDT  Patient Isolation: 21 9:17:45 CDT, Contact Precautions, Constant Indicator  Patient Isolation: 21 9:17:45 CDT, Droplet Precautions, Constant Indicator  Saline Lock Insert: 21 9:17:00 CDT, Stop date 21 9:17:00 CDT  Ordered (Dispatched)  COVID-19  IDnow: Stat collect, Nasal, 21 9:17:00 CDT, Stop date 21 9:17:00 CDT, Nurse collect  Osmolality Urine: Stat collect, Urine, 21 9:17:00 CDT, Stop date 21 9:17:00 CDT, Nurse collect  UA with Reflex: Stat collect, Urine, 21 9:17:00 CDT, Stop date 21 9:17:00 CDT, Nurse collect  Ordered (In-Lab)  Automated Diff: Now collect, 21 9:20:00 CDT, Blood, Collected, Stop date 21 9:20:00 CDT, Lab Collect, Print Label By Order Location, 21 9:17:00 CDT  CBC w/ Auto Diff: Now collect, 21 9:17:00 CDT, Blood, Stop date 21 9:17:00 CDT, Lab Collect, Print Label By Order Location, 21 9:17:00 CDT  CMP: Stat collect, 21 9:17:00 CDT, Blood, Stop date 21 9:17:00 CDT, Lab Collect, Print Label By Order Location, 21 9:17:00 CDT  Osmolality Serum: Stat collect, 21 9:17:00 CDT, Blood, Stop  date 09/27/21 9:17:00 CDT, Lab Collect, Print Label By Order Location, 09/27/21 9:17:00 CDT  Completed  XR Chest 1 View: Stat, 09/27/21 9:19:00 CDT, Cough, None, Patient Bed, Patient Has IV?, Rad Type, Not Scheduled, 09/27/21 9:19:00 CDT.   Cardiac monitor:  Time 9/27/2021 11:25:00, Rate 100, Sinus Tachycardia, for wekaness/lightheadedness.    Electrocardiogram:  Time 9/27/2021 09:45:00, rate 102, No ST-T changes, no ectopy, normal FL & QRS intervals, EP Interp, The Rhythm is sinus tachycardia.  .    Results review:  Lab results : Lab View   9/27/2021 10:04 CDT      Est Creat Clearance Ser   69.00 mL/min    9/27/2021 9:20 CDT       Sodium Lvl                125 mmol/L  LOW                             Potassium Lvl             3.3 mmol/L  LOW                             Chloride                  89 mmol/L  LOW                             CO2                       22 mmol/L                             Calcium Lvl               9.0 mg/dL                             Glucose Lvl               118 mg/dL  HI                             BUN                       9.2 mg/dL                             Creatinine                1.20 mg/dL  HI                             eGFR-AA                   >60  NA                             eGFR-REINALDO                  >60 mL/min/1.73 m2  NA                             Bili Total                0.7 mg/dL                             Bili Direct               0.3 mg/dL                             Bili Indirect             0.40 mg/dL                             AST                       28 unit/L                             ALT                       37 unit/L                             Alk Phos                  78 unit/L                             Total Protein             7.0 gm/dL                             Albumin Lvl               3.9 gm/dL                             Globulin                  3.1 gm/dL                             A/G Ratio                 1.3 ratio                              WBC                       3.4 x10(3)/mcL  LOW                             RBC                       3.94 x10(6)/mcL  LOW                             Hgb                       10.5 gm/dL  LOW                             Hct                       32.2 %  LOW                             Platelet                  175 x10(3)/mcL                             MCV                       81.7 fL                             MCH                       26.6 pg  LOW                             MCHC                      32.6 gm/dL  LOW                             RDW                       14.4 %                             MPV                       11.5 fL                             Abs Neut                  2.34 x10(3)/mcL                             Neutro Auto               69 %  NA                             Lymph Auto                20 %  NA                             Mono Auto                 10 %  NA                             Eos Auto                  0 %  NA                             Abs Eos                   0.0 x10(3)/mcL                             Basophil Auto             0 %  NA                             Abs Neutro                2.34 x10(3)/mcL                             Abs Lymph                 0.7 x10(3)/mcL                             Abs Mono                  0.3 x10(3)/mcL                             Abs Baso                  0.0 x10(3)/mcL  .   Radiology results:  Rad Results (ST)  < 12 hrs   Accession: [id number]  Order: XR Chest 1 View  Report Dt/Tm: 09/27/2021 09:37  Report:   Clinical History  Cough     Technique  Single view of the chest.     Comparison  10/18/2019     Findings  Lungs are clear with no visualized focal airspace opacity.  There is no evidence of pneumothorax or pleural effusion.  The cardiomediastinal silhouette is within normal limits.  No acute osseous abnormality.  The visualized abdomen is unremarkable.     Impression  No acute cardiopulmonary process.      .       Reexamination/ Reevaluation   Time: 9/27/2021 11:15:00 .   Vital signs   Basic Oxygen Information   9/27/2021 9:08 CDT       SpO2                      100 %                             Oxygen Therapy            Room air     Notes: asking for food.      Impression and Plan   Diagnosis   Generalized weakness (AKI81-YK R53.1)   Acute hyponatremia (PTZ20-PH E87.1)      Calls-Consults   -  9/27/2021 10:37:00 , hospitalist.    Plan   Condition: Improved.    Disposition: Admit time  9/27/2021 10:37:00, Admit to Inpatient Unit.    Counseled: Patient, Regarding diagnosis, Regarding diagnostic results, Regarding treatment plan, Patient indicated understanding of instructions.    Notes: I, Amanda Emmanuel, acted solely as a scribe for and in the presence of Dr. Moore who performed the service..       Addendum   I, Michelle Moore MD, performed the history, physical examination, MDM and procedures as above and agree with the scribe's documentation

## 2022-04-30 NOTE — OP NOTE
DATE OF SURGERY:    10/10/2017    SURGEON:  Ramesh Stanley MD    PREOPERATIVE DIAGNOSIS:  Cervical radiculopathy.    POSTOPERATIVE DIAGNOSIS:  Cervical radiculopathy.    PROCEDURE:  Fluoroscopically guided intralaminar cervical epidural injection at C3-4.     Equipment used:  Epidural tray.    PROCEDURE IN DETAIL:  Following informed consent, the patient was prepped and draped in the usual sterile fashion.  I infiltrated the tissue overlying C3-4 with local anesthetic.  Under fluoroscopic guidance, I advanced a 25-gauge 3.5 inch BD spinal needle into the epidural space.  Positioning was confirmed with administration of contrast.  I then instilled a combination of 16 mg Depo-Medrol and 1 mL of 5% dextrose in water.  I removed the needle and applied a sterile dressing.  The patient tolerated the procedure well without apparent complication.    IMPRESSION:  Successful fluoroscopically guided intralaminar cervical epidural injection at C3-4.        ______________________________  MD SABRINA Young/ASAD  DD:  10/10/2017  Time:  08:44AM  DT:  10/10/2017  Time:  04:23PM  Job #:  [id number]

## 2022-05-04 DIAGNOSIS — G24.3 CERVICAL DYSTONIA: Primary | ICD-10-CM

## 2022-05-14 NOTE — H&P
Update  No acute events since last visit.  NPO since midnight  All questions answered.  Sites marked.  ?   Harry?SHANNON Mcgrath MD  ?   Pt seen and examined in [facility].  He exhibits a suspected melanocytic nevus adjacent to  Right superior lipoma and he is told it might be good to include this  in excision? of the underlying lipoma.  Pt understands and wishes to have this performed.  ?   Gonzalez Sales MD  ?  ?  Chief Complaint  referral for lipoma on back  History of Present Illness  Patient is a 51-year-old gentleman with a 1 year history of 2?lipomas that have been growing for about 1 year on his back.? He has not had any pain, erythema,?or drainage from either site. ?Denies any prior surgical history to back or abdomen.? He has had prior orthopedic procedures to both ankles.? Denies any blood thinners?and does not take any medication daily aside from antihypertensives.  ?  Denies any fevers, chills, nausea, vomiting.  Denies any chest pain, shortness of breath  Physical Exam  ??Vitals & Measurements  ??T:?36.9? ?C (Oral)? HR:?86(Peripheral)? RR:?20? BP:?143/91? SpO2:?100%?  ??HT:?172.00?cm? WT:?82.300?kg? BMI:?27.82?  NAD, comfortable appearing  Neuro grossly in tact  Non labored respirations  Equal chest rise  Abdomen soft, non tender, non distended  Moving all extremities?  2 areas of soft,?nontender, nonfluctuant?mobile masses?characteristic of lipomas?to his right back.  Assessment/Plan  ?  1. ?Mass on back x2  ?  - Scheduled for surgery on 3/16/2022?remove both masses  - Consented and booked in clinic today; all risks, benefits, and alternative options have been discussed.  ?  Gabby Holder MD  [employer] General Surgery PGY 3

## 2022-05-14 NOTE — OP NOTE
Indication for Surgery  Lipoma on back x2  Preoperative Diagnosis  Lipoma  Postoperative Diagnosis  Same  Operation  Lipoma excision x2  Nevus excision  Surgeon(s)  MD Alcides Sales MD Deville, MD  Assistant  MS Mannie3  Anesthesia  GETA  Estimated Blood Loss  10mL  Urine Output  Refer to anesthesia report  Findings  2 lipomatous lesions, one benign-appearing nevus.  Specimen(s)  To lipomatous lesions; 1 ellipse of skin?containing nevus  Complications  None  Technique  After appropriate consent was obtained patient was taken to the operating room. ?General endotracheal anesthesia was introduced. ?Patient was then placed in the prone position.? His back was prepped in usual sterile manner and draped.? Attention was first directed to the caudal most lipoma, which was excised using scalpel?and dissection was carried down to the capsule using electrocautery.? The entire capsule was bluntly dissected and mobilized until the lipoma was freed from surrounding tissue.? It was sent for formal pathology. ?This area was?closed?using 3-0 Vicryl suture in interrupted?fashion skin was closed using 4-0 Monocryl.  ?  Attention was then turned to the cephalad most lipomatous lesion.? This area was?excised in an ellipse fashion containing the?nevus which was sent with pathology. ?In a similar manner, lipomatous lesion was dissected free from surrounding tissue using blunt and sharp?dissection and electrocautery. ?Cavity was closed with 3-0 Vicryl suture in an interrupted fashion. ?Skin was closed with Monocryl.? Both lesions were closed with Dermabond. ?Patient tolerated the procedure well and was taken to PACU in hemodynamically stable condition.  ?  Gabby Holder MD  [employer] General Surgery PGY 3  ?   This certifies I was present during the entire period between opening and closing of the surgical field.  ?   Gonzalez Sales MD

## 2022-05-20 ENCOUNTER — HOSPITAL ENCOUNTER (EMERGENCY)
Facility: HOSPITAL | Age: 52
Discharge: HOME | End: 2022-05-20
Attending: INTERNAL MEDICINE
Payer: MEDICARE

## 2022-05-20 VITALS
TEMPERATURE: 98 F | RESPIRATION RATE: 18 BRPM | HEART RATE: 80 BPM | WEIGHT: 180.75 LBS | SYSTOLIC BLOOD PRESSURE: 114 MMHG | DIASTOLIC BLOOD PRESSURE: 84 MMHG | OXYGEN SATURATION: 100 % | BODY MASS INDEX: 27.39 KG/M2 | HEIGHT: 68 IN

## 2022-05-20 DIAGNOSIS — K56.7 ILEUS OF UNSPECIFIED TYPE: Primary | ICD-10-CM

## 2022-05-20 DIAGNOSIS — R10.13 EPIGASTRIC PAIN: ICD-10-CM

## 2022-05-20 LAB
ALBUMIN SERPL-MCNC: 4.5 GM/DL (ref 3.5–5)
ALBUMIN/GLOB SERPL: 1.1 RATIO (ref 1.1–2)
ALP SERPL-CCNC: 93 UNIT/L (ref 40–150)
ALT SERPL-CCNC: 19 UNIT/L (ref 0–55)
AST SERPL-CCNC: 20 UNIT/L (ref 5–34)
BASOPHILS # BLD AUTO: 0.02 X10(3)/MCL (ref 0–0.2)
BASOPHILS NFR BLD AUTO: 0.6 %
BILIRUBIN DIRECT+TOT PNL SERPL-MCNC: 0.9 MG/DL
BUN SERPL-MCNC: 10.1 MG/DL (ref 8.4–25.7)
CALCIUM SERPL-MCNC: 10.2 MG/DL (ref 8.4–10.2)
CHLORIDE SERPL-SCNC: 96 MMOL/L (ref 98–107)
CO2 SERPL-SCNC: 30 MMOL/L (ref 22–29)
CREAT SERPL-MCNC: 1.4 MG/DL (ref 0.73–1.18)
EOSINOPHIL # BLD AUTO: 0.05 X10(3)/MCL (ref 0–0.9)
EOSINOPHIL NFR BLD AUTO: 1.5 %
ERYTHROCYTE [DISTWIDTH] IN BLOOD BY AUTOMATED COUNT: 13.9 % (ref 11.5–17)
GLOBULIN SER-MCNC: 4 GM/DL (ref 2.4–3.5)
GLUCOSE SERPL-MCNC: 84 MG/DL (ref 74–100)
HCT VFR BLD AUTO: 42.6 % (ref 42–52)
HGB BLD-MCNC: 13.5 GM/DL (ref 14–18)
IMM GRANULOCYTES # BLD AUTO: 0 X10(3)/MCL (ref 0–0.02)
IMM GRANULOCYTES NFR BLD AUTO: 0 % (ref 0–0.43)
LIPASE SERPL-CCNC: 33 U/L
LYMPHOCYTES # BLD AUTO: 1.47 X10(3)/MCL (ref 0.6–4.6)
LYMPHOCYTES NFR BLD AUTO: 43.8 %
MCH RBC QN AUTO: 26.3 PG (ref 27–31)
MCHC RBC AUTO-ENTMCNC: 31.7 MG/DL (ref 33–36)
MCV RBC AUTO: 83 FL (ref 80–94)
MONOCYTES # BLD AUTO: 0.38 X10(3)/MCL (ref 0.1–1.3)
MONOCYTES NFR BLD AUTO: 11.3 %
NEUTROPHILS # BLD AUTO: 1.4 X10(3)/MCL (ref 2.1–9.2)
NEUTROPHILS NFR BLD AUTO: 42.8 %
NRBC BLD AUTO-RTO: 0 %
PLATELET # BLD AUTO: 203 X10(3)/MCL (ref 130–400)
PMV BLD AUTO: 11 FL (ref 9.4–12.4)
POTASSIUM SERPL-SCNC: 3.8 MMOL/L (ref 3.5–5.1)
PROT SERPL-MCNC: 8.5 GM/DL (ref 6.4–8.3)
RBC # BLD AUTO: 5.13 X10(6)/MCL (ref 4.7–6.1)
SODIUM SERPL-SCNC: 134 MMOL/L (ref 136–145)
TROPONIN I SERPL-MCNC: <0.01 NG/ML (ref 0–0.04)
WBC # SPEC AUTO: 3.4 X10(3)/MCL (ref 4.5–11.5)

## 2022-05-20 PROCEDURE — 80053 COMPREHEN METABOLIC PANEL: CPT | Performed by: PHYSICIAN ASSISTANT

## 2022-05-20 PROCEDURE — 84484 ASSAY OF TROPONIN QUANT: CPT | Performed by: PHYSICIAN ASSISTANT

## 2022-05-20 PROCEDURE — 83690 ASSAY OF LIPASE: CPT | Performed by: PHYSICIAN ASSISTANT

## 2022-05-20 PROCEDURE — 85025 COMPLETE CBC W/AUTO DIFF WBC: CPT | Performed by: PHYSICIAN ASSISTANT

## 2022-05-20 PROCEDURE — 93005 ELECTROCARDIOGRAM TRACING: CPT

## 2022-05-20 PROCEDURE — 36415 COLL VENOUS BLD VENIPUNCTURE: CPT | Performed by: PHYSICIAN ASSISTANT

## 2022-05-20 PROCEDURE — 25000003 PHARM REV CODE 250: Performed by: PHYSICIAN ASSISTANT

## 2022-05-20 PROCEDURE — 99285 EMERGENCY DEPT VISIT HI MDM: CPT | Mod: 25

## 2022-05-20 RX ORDER — ALUMINUM HYDROXIDE, MAGNESIUM HYDROXIDE, AND SIMETHICONE 2400; 240; 2400 MG/30ML; MG/30ML; MG/30ML
5 SUSPENSION ORAL EVERY 6 HOURS PRN
Qty: 100 ML | Refills: 0 | Status: SHIPPED | OUTPATIENT
Start: 2022-05-20 | End: 2022-11-30

## 2022-05-20 RX ORDER — MAG HYDROX/ALUMINUM HYD/SIMETH 200-200-20
30 SUSPENSION, ORAL (FINAL DOSE FORM) ORAL ONCE
Status: COMPLETED | OUTPATIENT
Start: 2022-05-20 | End: 2022-05-20

## 2022-05-20 RX ORDER — LIDOCAINE HYDROCHLORIDE 20 MG/ML
10 SOLUTION OROPHARYNGEAL ONCE
Status: COMPLETED | OUTPATIENT
Start: 2022-05-20 | End: 2022-05-20

## 2022-05-20 RX ADMIN — ALUMINUM HYDROXIDE, MAGNESIUM HYDROXIDE, AND SIMETHICONE 30 ML: 200; 200; 20 SUSPENSION ORAL at 05:05

## 2022-05-20 RX ADMIN — LIDOCAINE HYDROCHLORIDE 10 ML: 20 SOLUTION ORAL; TOPICAL at 05:05

## 2022-05-20 NOTE — ED PROVIDER NOTES
Encounter Date: 5/20/2022       History     Chief Complaint   Patient presents with    Abdominal Pain     C/o abdominal pain. Pt states burning x 1 week. Denies any n/v/d     52 yo M w/ PMHx significant for HU, MDD, HTN, HLD, obesity & GERD presents to ED c/o 1 week hx of epigastric burning. Reports intermittent mild nausea, but otherwise denies all GI symptoms. Reports symptoms are preventing him from sleeping. Denies CP, SOB, palpitations, diaphoresis, syncope, orthopnea, edema. VSS on arrival w/ no signs of distress        Review of patient's allergies indicates:  No Known Allergies  Past Medical History:   Diagnosis Date    GERD (gastroesophageal reflux disease)     Hypertension      History reviewed. No pertinent surgical history.  History reviewed. No pertinent family history.  Social History     Tobacco Use    Smoking status: Never Smoker    Smokeless tobacco: Never Used   Substance Use Topics    Alcohol use: Never    Drug use: Never     Review of Systems   Constitutional: Negative for appetite change, chills, diaphoresis, fatigue, fever and unexpected weight change.   HENT: Negative for congestion, rhinorrhea and sore throat.    Respiratory: Negative for cough and shortness of breath.    Cardiovascular: Negative for chest pain, palpitations and leg swelling.   Gastrointestinal: Positive for abdominal pain and nausea. Negative for abdominal distention, anal bleeding, blood in stool, constipation, diarrhea, rectal pain and vomiting.   Endocrine: Negative for polydipsia, polyphagia and polyuria.   Genitourinary: Negative for decreased urine volume, dysuria, flank pain, frequency, hematuria and urgency.   Musculoskeletal: Negative for arthralgias, back pain and myalgias.   Skin: Negative for pallor and rash.   Neurological: Negative for dizziness, syncope, weakness, light-headedness, numbness and headaches.       Physical Exam     Initial Vitals [05/20/22 1616]   BP Pulse Resp Temp SpO2   113/83 87 20  97.5 °F (36.4 °C) 99 %      MAP       --         Physical Exam    Nursing note and vitals reviewed.  Constitutional: He appears well-developed and well-nourished. No distress.   HENT:   Head: Normocephalic and atraumatic.   Eyes: Conjunctivae are normal. Pupils are equal, round, and reactive to light.   Neck: Neck supple. No JVD present.   Normal range of motion.  Cardiovascular: Normal rate, regular rhythm, normal heart sounds and intact distal pulses. Exam reveals no gallop and no friction rub.    No murmur heard.  Pulmonary/Chest: Breath sounds normal. No respiratory distress. He has no wheezes. He has no rales.   Abdominal: Abdomen is soft. Bowel sounds are normal. He exhibits no distension and no mass. There is abdominal tenderness (epigastric, mild). There is no rebound and no guarding.   Musculoskeletal:         General: No edema. Normal range of motion.      Cervical back: Normal range of motion and neck supple.     Neurological: He is alert and oriented to person, place, and time. No cranial nerve deficit.   Skin: Skin is warm and dry. Capillary refill takes less than 2 seconds.   Psychiatric: He has a normal mood and affect.         ED Course   Procedures  Labs Reviewed   COMPREHENSIVE METABOLIC PANEL - Abnormal; Notable for the following components:       Result Value    Sodium Level 134 (*)     Chloride 96 (*)     Carbon Dioxide 30 (*)     Creatinine 1.40 (*)     Protein Total 8.5 (*)     Globulin 4.0 (*)     All other components within normal limits   CBC WITH DIFFERENTIAL - Abnormal; Notable for the following components:    WBC 3.4 (*)     Hgb 13.5 (*)     MCH 26.3 (*)     MCHC 31.7 (*)     Neut # 1.4 (*)     All other components within normal limits   TROPONIN I - Normal   LIPASE - Normal   CBC W/ AUTO DIFFERENTIAL    Narrative:     The following orders were created for panel order CBC Auto Differential.  Procedure                               Abnormality         Status                     ---------                                -----------         ------                     CBC with Differential[[id number]]        Abnormal            Final result                 Please view results for these tests on the individual orders.   EXTRA TUBES    Narrative:     The following orders were created for panel order EXTRA TUBES.  Procedure                               Abnormality         Status                     ---------                               -----------         ------                     Light Blue Top Hold[[id number]]                              In process                 Gold Top Hold[[id number]]                                    In process                   Please view results for these tests on the individual orders.   LIGHT BLUE TOP HOLD   GOLD TOP HOLD     EKG Readings: (Independently Interpreted)   Initial Reading: No STEMI. Rhythm: Sinus Tachycardia. Heart Rate: 105. Ectopy: No Ectopy. Conduction: Normal. ST Segments: Normal ST Segments. T Waves: Normal. Axis: Normal. Clinical Impression: Sinus Tachycardia     ECG Results          EKG 12-lead (In process)  Result time 05/20/22 17:07:38    In process by Interface, Lab In [facility] (05/20/22 17:07:38)                 Narrative:    Test Reason : R10.13,    Vent. Rate : 105 BPM     Atrial Rate : 105 BPM     P-R Int : 134 ms          QRS Dur : 082 ms      QT Int : 310 ms       P-R-T Axes : 074 020 021 degrees     QTc Int : 409 ms    Sinus tachycardia  Nonspecific T wave abnormality  Abnormal ECG  No previous ECGs available    Referred By:             Confirmed By:                             Imaging Results          CT Abdomen Pelvis  Without Contrast (Final result)  Result time 05/20/22 19:16:29    Final result by Kalen Conley MD (05/20/22 19:16:29)                 Impression:      1.  Stomach gaseous distension and to a lesser degree the small bowel and predominantly the transverse colon without delineation of transition point.  Ileus is suggested.    2.  Prostatomegaly.      Electronically signed by: Kalen Conley  Date:    05/20/2022  Time:    19:16             Narrative:    EXAMINATION:  CT ABDOMEN PELVIS WITHOUT CONTRAST    CLINICAL HISTORY:  abnormal XR;    TECHNIQUE:  Multidetector axial images were obtained from the  diaphragms to below symphysis pubis without the administration of IV contrast. Oral contrast was not administered.    Dose length product of 220 mGycm. Automated exposure control was utilized to minimize radiation dose.    COMPARISON:  November 27    FINDINGS:  Included lungs are without suspicious nodularity, acute air space infiltrates or fluid within the pleural spaces.    Within limitations of noncontrast technique, no acute findings of the liver, pancreas and spleen identified. Gallbladder wall is not thickened and there is no intraluminal calcified calculus. No apparent biliary dilation.    The adrenal glands noncontrast evaluation is unremarkable.    The kidneys are unremarkable in size and contour. There is no hydronephrosis or nephrolithiasis. The ureters appear normal in course and diameter without intra ureteral stone.    There is gaseous distention of the stomach which contains small amount of fluid.  There is mild dilatation of few loops of small bowel without transition point.  Appendix is not dilated.  There is gaseous distension of the transverse colon which is redundant.  Stool is present in the ascending and the descending colon without pericolonic acute strandings.  No free fluid.    Urinary bladder appears within normal limits. No intravesical stone identified.  Prostate is enlarged in size.  There is no pelvic free fluid.    There are degenerative changes at L5-S1.  No acute or aggressive skeletal abnormality                               X-Ray Abdomen Flat And Erect (Final result)  Result time 05/20/22 18:10:14    Final result by Kalen Conley MD (05/20/22 18:10:14)                 Impression:      Considerable gaseous  distention of the redundant colon suggests ileus.      Electronically signed by: Kalen Conley  Date:    05/20/2022  Time:    18:10             Narrative:    EXAMINATION:  XR ABDOMEN FLAT AND ERECT    CLINICAL HISTORY:  Epigastric pain    TECHNIQUE:  Two views    FINDINGS:  There is gaseous distention of colon which is quite redundant.  This suggests ileus.  No significant associated dilatation of loops of small bowel.  Please correlate clinically.  Follow up exams are recommended.                               X-Ray Chest 1 View (Final result)  Result time 05/20/22 18:07:05    Final result by Kalen Conley MD (05/20/22 18:07:05)                 Impression:      NO ACUTE CARDIOPULMONARY PROCESS IDENTIFIED.      Electronically signed by: Kalen Conley  Date:    05/20/2022  Time:    18:07             Narrative:    EXAMINATION:  XR CHEST 1 VIEW    CLINICAL HISTORY:  epigastric pain;    TECHNIQUE:  One view    COMPARISON:  November 18, 2021.    FINDINGS:  Cardiopericardial silhouette is within normal limits. Lungs are without dense focal or segmental consolidation, congestive process, pleural effusions or pneumothorax.                                 Medications   aluminum-magnesium hydroxide-simethicone 200-200-20 mg/5 mL suspension 30 mL (30 mLs Oral Given 5/20/22 1748)     And   LIDOcaine HCl 2% oral solution 10 mL (10 mLs Oral Given 5/20/22 1748)     Medical Decision Making:   Clinical Tests:   Lab Tests: Ordered and Reviewed  Radiological Study: Ordered and Reviewed  Medical Tests: Ordered and Reviewed  ED Management:  Cardiac workup unremarkable w/ low-risk HEART & HOLLY scores. Presentation not consistent w/ ACS. CXR unremarkable. CT ordered to follow-up XR consistent w/ ileus, but otherwise unremarkable. No evidence of transition point or mechanical obstruction. Patient able to tolerate PO while in ED. Bloodwork relatively unremarkable. Patient reports improvement of symptoms w/ GI cocktail. Will discharge w/  stool softener & maalox & instructed to continue taking all other meds as prescribed. Instructed to contact PCP for follow-up appointment on Monday    Additional MDM:   Heart Score:    History:          Slightly suspicious.  ECG:             Normal  Age:               45-65 years  Risk factors: >= 3 risk factors or history of atherosclerotic disease  Troponin:       Less than or equal to normal limit  Final Score: 3      HOLLY Score:   Age over 65:                                    0.00   > or = to 3 CAD risk factors:           1.00  Established CAD:                            0.00  > or = to 2 anginal events in the past 24 hours: 0.00  Use of ASA in past 7 days:              0.00  Elevated Enzymes:                         0.00  ST Depression > or = to 0.05 mV:  0.00  HOLLY score: 1          ED Course as of 05/20/22 1946   Fri May 20, 2022   1844 Abdominal XR showed gaseous distension & radiologist recommended follow-up exam. I have ordered CT abdomen & pelvis. Patient resting comfortably w/ no acute complaints [NB]      ED Course User Index  [NB] THEODORA Burden             Clinical Impression:   Final diagnoses:  [R10.13] Epigastric pain  [K56.7] Ileus of unspecified type (Primary)          ED Disposition Condition    Discharge Good        ED Prescriptions     Medication Sig Dispense Start Date End Date Auth. Provider    aluminum & magnesium hydroxide-simethicone (MYLANTA MAX STRENGTH) 400-400-40 mg/5 mL suspension Take 5 mLs by mouth every 6 (six) hours as needed for Indigestion. 100 mL 5/20/2022 5/20/2023 THEODORA Burden        Follow-up Information     Follow up With Specialties Details Why Contact Info    PCP  In 3 days             THEODORA Burden  05/20/22 1946

## 2022-05-21 NOTE — DISCHARGE INSTRUCTIONS
Report to Emergency Department if symptoms return or worsen; [facility] Within 1 to 2 days, It is important that you follow up with your primary care provider or specialist if indicated for further evaluation, workup, and treatment as necessary. The exam and treatment you received in Emergency Department was for an urgent problem and NOT INTENDED AS COMPLETE CARE. It is important that you FOLLOW UP with a doctor for ongoing care. If your symptoms become WORSE or you DO NOT IMPROVE and you are unable to reach your health care provider, you should RETURN to the Emergency Department. The Emergency Department provider has provided a PRELIMINARY INTERPRETATION of all your studies. A final interpretation may be done after you are discharged. If a change in your diagnosis or treatment is needed WE WILL CONTACT YOU. It is critical that we have a CURRENT PHONE NUMBER FOR YOU.

## 2022-09-15 ENCOUNTER — OFFICE VISIT (OUTPATIENT)
Dept: UROLOGY | Facility: CLINIC | Age: 52
End: 2022-09-15
Payer: MEDICARE

## 2022-09-15 VITALS
BODY MASS INDEX: 29.86 KG/M2 | TEMPERATURE: 98 F | DIASTOLIC BLOOD PRESSURE: 82 MMHG | OXYGEN SATURATION: 98 % | SYSTOLIC BLOOD PRESSURE: 116 MMHG | HEART RATE: 93 BPM | RESPIRATION RATE: 20 BRPM | WEIGHT: 197 LBS | HEIGHT: 68 IN

## 2022-09-15 DIAGNOSIS — N13.8 BPH WITH OBSTRUCTION/LOWER URINARY TRACT SYMPTOMS: ICD-10-CM

## 2022-09-15 DIAGNOSIS — Z87.898 HISTORY OF URINARY RETENTION: Primary | ICD-10-CM

## 2022-09-15 DIAGNOSIS — N40.1 BPH WITH OBSTRUCTION/LOWER URINARY TRACT SYMPTOMS: ICD-10-CM

## 2022-09-15 PROCEDURE — 99214 OFFICE O/P EST MOD 30 MIN: CPT | Mod: S$PBB,,, | Performed by: UROLOGY

## 2022-09-15 PROCEDURE — 99214 PR OFFICE/OUTPT VISIT, EST, LEVL IV, 30-39 MIN: ICD-10-PCS | Mod: S$PBB,,, | Performed by: UROLOGY

## 2022-09-15 PROCEDURE — 99214 OFFICE O/P EST MOD 30 MIN: CPT | Mod: PBBFAC

## 2022-09-15 RX ORDER — TAMSULOSIN HYDROCHLORIDE 0.4 MG/1
1 CAPSULE ORAL DAILY
Qty: 90 CAPSULE | Refills: 3 | Status: SHIPPED | OUTPATIENT
Start: 2022-09-15 | End: 2022-11-30

## 2022-09-15 RX ORDER — TAMSULOSIN HYDROCHLORIDE 0.4 MG/1
1 CAPSULE ORAL DAILY
COMMUNITY
Start: 2022-08-26 | End: 2022-12-15 | Stop reason: SDUPTHER

## 2022-09-15 RX ORDER — FINASTERIDE 5 MG/1
5 TABLET, FILM COATED ORAL DAILY
COMMUNITY
Start: 2022-08-26

## 2022-09-15 RX ORDER — DULOXETIN HYDROCHLORIDE 60 MG/1
60 CAPSULE, DELAYED RELEASE ORAL DAILY
COMMUNITY
Start: 2022-06-30 | End: 2022-11-30

## 2022-09-15 RX ORDER — ONDANSETRON 8 MG/1
8 TABLET, ORALLY DISINTEGRATING ORAL 3 TIMES DAILY
COMMUNITY
Start: 2022-09-07 | End: 2022-11-30

## 2022-09-15 RX ORDER — CLONAZEPAM 1 MG/1
0.5 TABLET ORAL 3 TIMES DAILY
COMMUNITY

## 2022-09-15 RX ORDER — LISINOPRIL AND HYDROCHLOROTHIAZIDE 20; 25 MG/1; MG/1
1 TABLET ORAL DAILY
COMMUNITY
Start: 2022-08-26

## 2022-09-15 RX ORDER — ATORVASTATIN CALCIUM 80 MG/1
80 TABLET, FILM COATED ORAL DAILY
COMMUNITY
Start: 2022-06-03

## 2022-09-15 NOTE — PROGRESS NOTES
Patient seen by Dr. NADINE Adame. Will return in 3 months with bladder scan. Written and verbal discharge instructions given.

## 2022-09-15 NOTE — PROGRESS NOTES
HPI:  Larry Riggs is a 51 y.o. male seen for follow-up of urinary retention.  The patient had an episode of urinary retention in December of 2021.  He has been doing intermittent self catheterization 3 times a day.  These are not all necessarily postvoid but he does note when they are postvoid that he only gets a few drops of urine out.  When he catheterizes randomly it is about 100 cc.  He is on tamsulosin.      ROS:  All systems reviewed and are negative except as documented in HPI and/or Assessment and Plan.     Patient Active Problem List:     Patient Active Problem List   Diagnosis    Encephalopathy        Past Medical History:    Past Medical History:   Diagnosis Date    GERD (gastroesophageal reflux disease)     Hypertension           Past Surgical History:    Past Surgical History:   Procedure Laterality Date    ANKLE SURGERY Right     KNEE SURGERY Left     neck fusion N/A         Family History:    Family History   Problem Relation Age of Onset    Hypertension Father     Hypertension Mother           Social History:  Social History     Socioeconomic History    Marital status:    Tobacco Use    Smoking status: Never    Smokeless tobacco: Never   Substance and Sexual Activity    Alcohol use: Never    Drug use: Never        Allergies:  Review of patient's allergies indicates:  No Known Allergies     Objective:  Vitals:    09/15/22 0744   BP: 116/82   Pulse: 93   Resp: 20   Temp: 98.3 °F (36.8 °C)       General:  Well developed, well nourished adult male in no acute distress  Abdomen: Soft, nontender, no masses  Extremities:  No clubbing, cyanosis, or edema  Neurologic:  Grossly intact  Musculoskeletal:  Normal tone        Lab Results:   Patient's creatinine is 1.4.  This will need to continue to be observed.    Imaging:   CT scan on 20 May 2022 showed a negative urinary tract.    Urinalysis:  Urine dipstick shows not done.  Micro exam: not done.     Assessment and Plan:  1. History of urinary  retention    2. BPH with obstruction/lower urinary tract symptoms   He was instructed to get discontinue self catheterization.  If he has difficulty urinating or feels the bladder is not emptying he will resume.  Continue tamsulosin 0.4 mg, #90 with three refills.  A review of his laboratory results reveals that he has not had a PSA in the last year so we will arrange this at his next visit.  He likely needs a EDI as well.    Follow-up:  Three months with a bladder scan.

## 2022-10-03 DIAGNOSIS — R11.0 NAUSEA: Primary | ICD-10-CM

## 2022-10-05 ENCOUNTER — HOSPITAL ENCOUNTER (OUTPATIENT)
Dept: RADIOLOGY | Facility: HOSPITAL | Age: 52
Discharge: HOME | End: 2022-10-05
Attending: FAMILY MEDICINE
Payer: MEDICARE

## 2022-10-05 DIAGNOSIS — R11.0 NAUSEA: ICD-10-CM

## 2022-10-05 PROCEDURE — 78226 HEPATOBILIARY SYSTEM IMAGING: CPT | Mod: TC

## 2022-10-05 PROCEDURE — A9537 TC99M MEBROFENIN: HCPCS

## 2022-11-10 DIAGNOSIS — D72.819 LEUKOPENIA: Primary | ICD-10-CM

## 2022-11-21 PROBLEM — D70.9 NEUTROPENIA: Status: ACTIVE | Noted: 2022-11-21

## 2022-11-21 PROBLEM — D72.819 LEUKOPENIA: Status: ACTIVE | Noted: 2022-11-21
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
## 2022-11-22 ENCOUNTER — OFFICE VISIT (OUTPATIENT)
Dept: HEMATOLOGY/ONCOLOGY | Facility: CLINIC | Age: 52
End: 2022-11-22
Payer: MEDICARE

## 2022-11-22 VITALS
WEIGHT: 199.94 LBS | BODY MASS INDEX: 30.3 KG/M2 | SYSTOLIC BLOOD PRESSURE: 125 MMHG | TEMPERATURE: 98 F | HEIGHT: 68 IN | RESPIRATION RATE: 20 BRPM | DIASTOLIC BLOOD PRESSURE: 83 MMHG | OXYGEN SATURATION: 99 % | HEART RATE: 85 BPM

## 2022-11-22 DIAGNOSIS — D63.0 ANEMIA IN NEOPLASTIC DISEASE: ICD-10-CM

## 2022-11-22 DIAGNOSIS — D72.819 LEUKOPENIA: ICD-10-CM

## 2022-11-22 DIAGNOSIS — D70.9 NEUTROPENIA, UNSPECIFIED TYPE: ICD-10-CM

## 2022-11-22 DIAGNOSIS — D72.819 LEUKOPENIA, UNSPECIFIED TYPE: ICD-10-CM

## 2022-11-22 DIAGNOSIS — E63.8 OTHER SPECIFIED NUTRITIONAL DEFICIENCIES: ICD-10-CM

## 2022-11-22 DIAGNOSIS — G93.40 ENCEPHALOPATHY: ICD-10-CM

## 2022-11-22 DIAGNOSIS — D52.0 DIETARY FOLATE DEFICIENCY ANEMIA: ICD-10-CM

## 2022-11-22 DIAGNOSIS — D52.0 DIETARY FOLATE DEFICIENCY ANEMIA: Primary | ICD-10-CM

## 2022-11-22 DIAGNOSIS — D72.819 LEUKOPENIA, UNSPECIFIED TYPE: Primary | ICD-10-CM

## 2022-11-22 LAB
ALBUMIN SERPL-MCNC: 4.3 GM/DL (ref 3.5–5)
ALBUMIN/GLOB SERPL: 1.2 RATIO (ref 1.1–2)
ALP SERPL-CCNC: 77 UNIT/L (ref 40–150)
ALT SERPL-CCNC: 30 UNIT/L (ref 0–55)
AST SERPL-CCNC: 24 UNIT/L (ref 5–34)
BASOPHILS # BLD AUTO: 0.01 X10(3)/MCL (ref 0–0.2)
BASOPHILS NFR BLD AUTO: 0.4 %
BILIRUBIN DIRECT+TOT PNL SERPL-MCNC: 0.7 MG/DL
BUN SERPL-MCNC: 15.2 MG/DL (ref 8.4–25.7)
CALCIUM SERPL-MCNC: 9.8 MG/DL (ref 8.4–10.2)
CHLORIDE SERPL-SCNC: 101 MMOL/L (ref 98–107)
CO2 SERPL-SCNC: 28 MMOL/L (ref 22–29)
CREAT SERPL-MCNC: 1.24 MG/DL (ref 0.73–1.18)
CRP SERPL-MCNC: 0.4 MG/L
EOSINOPHIL # BLD AUTO: 0.03 X10(3)/MCL (ref 0–0.9)
EOSINOPHIL NFR BLD AUTO: 1.1 %
ERYTHROCYTE [DISTWIDTH] IN BLOOD BY AUTOMATED COUNT: 14.3 % (ref 11.5–17)
ERYTHROCYTE [SEDIMENTATION RATE] IN BLOOD: 7 MM/HR (ref 0–15)
FOLATE SERPL-MCNC: 13.8 NG/ML (ref 7–31.4)
GFR SERPLBLD CREATININE-BSD FMLA CKD-EPI: >60 MLS/MIN/1.73/M2
GLOBULIN SER-MCNC: 3.6 GM/DL (ref 2.4–3.5)
GLUCOSE SERPL-MCNC: 89 MG/DL (ref 74–100)
HBV CORE AB SERPL QL IA: NONREACTIVE
HBV SURFACE AG SERPL QL IA: NONREACTIVE
HCT VFR BLD AUTO: 43.2 % (ref 42–52)
HCV AB SERPL QL IA: NONREACTIVE
HGB BLD-MCNC: 14.2 GM/DL (ref 14–18)
HIV 1+2 AB+HIV1 P24 AG SERPL QL IA: NONREACTIVE
IMM GRANULOCYTES # BLD AUTO: 0 X10(3)/MCL (ref 0–0.04)
IMM GRANULOCYTES NFR BLD AUTO: 0 %
LYMPHOCYTES # BLD AUTO: 1.07 X10(3)/MCL (ref 0.6–4.6)
LYMPHOCYTES NFR BLD AUTO: 39.3 %
MCH RBC QN AUTO: 27.8 PG (ref 27–31)
MCHC RBC AUTO-ENTMCNC: 32.9 MG/DL (ref 33–36)
MCV RBC AUTO: 84.7 FL (ref 80–94)
MONOCYTES # BLD AUTO: 0.25 X10(3)/MCL (ref 0.1–1.3)
MONOCYTES NFR BLD AUTO: 9.2 %
NEUTROPHILS # BLD AUTO: 1.4 X10(3)/MCL (ref 2.1–9.2)
NEUTROPHILS NFR BLD AUTO: 50 %
NRBC BLD AUTO-RTO: 0 %
PLATELET # BLD AUTO: 196 X10(3)/MCL (ref 130–400)
PMV BLD AUTO: 10.8 FL (ref 7.4–10.4)
POTASSIUM SERPL-SCNC: 3.9 MMOL/L (ref 3.5–5.1)
PROT SERPL-MCNC: 7.9 GM/DL (ref 6.4–8.3)
RBC # BLD AUTO: 5.1 X10(6)/MCL (ref 4.7–6.1)
SODIUM SERPL-SCNC: 138 MMOL/L (ref 136–145)
VIT B12 SERPL-MCNC: 889 PG/ML (ref 213–816)
WBC # SPEC AUTO: 2.7 X10(3)/MCL (ref 4.5–11.5)

## 2022-11-22 PROCEDURE — 36415 COLL VENOUS BLD VENIPUNCTURE: CPT

## 2022-11-22 PROCEDURE — 99204 OFFICE O/P NEW MOD 45 MIN: CPT | Mod: S$PBB,,, | Performed by: INTERNAL MEDICINE

## 2022-11-22 PROCEDURE — 99204 PR OFFICE/OUTPT VISIT, NEW, LEVL IV, 45-59 MIN: ICD-10-PCS | Mod: S$PBB,,, | Performed by: INTERNAL MEDICINE

## 2022-11-22 PROCEDURE — 99214 OFFICE O/P EST MOD 30 MIN: CPT | Mod: PBBFAC | Performed by: INTERNAL MEDICINE

## 2022-11-22 NOTE — Clinical Note
Orders for today:   Check CBC, CMP, vitamin B12 level, folic acid level, copper level, hepatitis-B surface antigen, hepatitis-B core antibody, hepatitis-C antibody, HIV screen, ESR, CRP, antinuclear antibody, flow cytometry of blood (leukemia lymphoma panel)   Need vitamin B12 and folic acid level.   System is not letting me end of these orders.   Please order today.  Follow-up in 2 weeks, with labs

## 2022-11-23 LAB — COPPER SERPL-MCNC: 100 MCG/DL (ref 73–129)

## 2022-11-25 LAB
AR ANA INTERPRETIVE COMMENT: NORMAL
AR ANTINUCLEAR ANTIBODY (ANA), HEP-2, IGG: NORMAL

## 2022-11-30 ENCOUNTER — OFFICE VISIT (OUTPATIENT)
Dept: NEUROLOGY | Facility: CLINIC | Age: 52
End: 2022-11-30
Payer: MEDICARE

## 2022-11-30 VITALS
SYSTOLIC BLOOD PRESSURE: 122 MMHG | BODY MASS INDEX: 29.86 KG/M2 | HEIGHT: 68 IN | WEIGHT: 197 LBS | HEART RATE: 84 BPM | DIASTOLIC BLOOD PRESSURE: 86 MMHG

## 2022-11-30 DIAGNOSIS — G24.01 TARDIVE DYSTONIA: Primary | ICD-10-CM

## 2022-11-30 DIAGNOSIS — G24.3 CERVICAL DYSTONIA: ICD-10-CM

## 2022-11-30 PROCEDURE — 99999 PR PBB SHADOW E&M-EST. PATIENT-LVL IV: ICD-10-PCS | Mod: PBBFAC,,, | Performed by: PSYCHIATRY & NEUROLOGY

## 2022-11-30 PROCEDURE — 99204 PR OFFICE/OUTPT VISIT, NEW, LEVL IV, 45-59 MIN: ICD-10-PCS | Mod: S$PBB,,, | Performed by: PSYCHIATRY & NEUROLOGY

## 2022-11-30 PROCEDURE — 99214 OFFICE O/P EST MOD 30 MIN: CPT | Mod: PBBFAC | Performed by: PSYCHIATRY & NEUROLOGY

## 2022-11-30 PROCEDURE — 99204 OFFICE O/P NEW MOD 45 MIN: CPT | Mod: S$PBB,,, | Performed by: PSYCHIATRY & NEUROLOGY

## 2022-11-30 PROCEDURE — 99999 PR PBB SHADOW E&M-EST. PATIENT-LVL IV: CPT | Mod: PBBFAC,,, | Performed by: PSYCHIATRY & NEUROLOGY

## 2022-11-30 RX ORDER — QUETIAPINE FUMARATE 300 MG/1
300 TABLET, FILM COATED ORAL NIGHTLY
COMMUNITY

## 2022-11-30 RX ORDER — RISPERIDONE 4 MG/1
4 TABLET ORAL 2 TIMES DAILY
COMMUNITY

## 2022-11-30 RX ORDER — VALACYCLOVIR HYDROCHLORIDE 500 MG/1
500 TABLET, FILM COATED ORAL
COMMUNITY

## 2022-11-30 RX ORDER — BENZTROPINE MESYLATE 0.5 MG/1
0.5 TABLET ORAL 2 TIMES DAILY
Qty: 60 TABLET | Refills: 0 | Status: SHIPPED | OUTPATIENT
Start: 2022-11-30 | End: 2022-12-28

## 2022-11-30 RX ORDER — PAROXETINE HYDROCHLORIDE 40 MG/1
30 TABLET, FILM COATED ORAL DAILY
COMMUNITY

## 2022-11-30 RX ORDER — DULOXETIN HYDROCHLORIDE 30 MG/1
30 CAPSULE, DELAYED RELEASE ORAL 2 TIMES DAILY
COMMUNITY
Start: 2022-11-11

## 2022-11-30 NOTE — PROGRESS NOTES
Chief Complaint   Patient presents with    Cervical dystonia     NP: Pt referred by Dr. Seun Murillo for neuro consult to evaluate for cervical dystonia; Pt states difficulties started 10 yrs ago currently having pain bilateral sides of neck pain level 8/10 denies numbness/tingling cervical fusion with Dr. Chand 10 yrs ago         This is a 52 y.o. male  here for cervical dystonia.  Patient reports difficulty starting 10 years ago, previously saw Dr. Nina.  Patient has pain in the neck as well as upward movements of the neck.  Also has turning of the neck to the left.  Patient reports severe pain in the muscles in the back of the neck.  He has tried MARY with Dr. Stanley which was not helpful as well as Botox injections with Dr. Nina 3 separate occasions.  He does not recall this being helpful and is not interested in this at this time.  Of note patient has been on psychiatric medicines for some time, currently is on Risperdal.     Medication List with Changes/Refills   New Medications    BENZTROPINE (COGENTIN) 0.5 MG TABLET    Take 1 tablet (0.5 mg total) by mouth 2 (two) times daily.   Current Medications    ATORVASTATIN (LIPITOR) 80 MG TABLET    Take 80 mg by mouth once daily.    CLONAZEPAM (KLONOPIN) 1 MG TABLET    Take 0.5 mg by mouth 2 (two) times daily.    DULOXETINE (CYMBALTA) 30 MG CAPSULE    Take 30 mg by mouth 2 (two) times daily.    FINASTERIDE (PROSCAR) 5 MG TABLET    Take 5 mg by mouth once daily.    LISINOPRIL-HYDROCHLOROTHIAZIDE (PRINZIDE,ZESTORETIC) 20-25 MG TAB    Take 1 tablet by mouth once daily.    PAROXETINE (PAXIL) 40 MG TABLET    Take 30 mg by mouth once daily.    QUETIAPINE (SEROQUEL) 300 MG TAB    Take 300 mg by mouth every evening.    RISPERIDONE (RISPERDAL) 4 MG TABLET    Take 4 mg by mouth 2 (two) times daily.    TAMSULOSIN (FLOMAX) 0.4 MG CAP    Take 1 capsule by mouth once daily.    VALACYCLOVIR (VALTREX) 500 MG TABLET    Take 500 mg by mouth as needed.   Discontinued Medications     ALUMINUM & MAGNESIUM HYDROXIDE-SIMETHICONE (MYLANTA MAX STRENGTH) 400-400-40 MG/5 ML SUSPENSION    Take 5 mLs by mouth every 6 (six) hours as needed for Indigestion.    DULOXETINE (CYMBALTA) 60 MG CAPSULE    Take 60 mg by mouth once daily.    ONDANSETRON (ZOFRAN-ODT) 8 MG TBDL    Take 8 mg by mouth 3 (three) times daily.    TAMSULOSIN (FLOMAX) 0.4 MG CAP    Take 1 capsule (0.4 mg total) by mouth once daily.        Past Surgical History:   Procedure Laterality Date    ANKLE SURGERY Right     KNEE SURGERY Left     neck fusion N/A         Past Medical History:   Diagnosis Date    GERD (gastroesophageal reflux disease)     Hypertension         Family History   Problem Relation Age of Onset    Hypertension Father     Hypertension Mother         Social History     Socioeconomic History    Marital status:    Tobacco Use    Smoking status: Never    Smokeless tobacco: Never   Substance and Sexual Activity    Alcohol use: Never    Drug use: Never          Review of Systems  Review of Systems   Constitutional: Negative for appetite change.   HENT: Negative for sinus pressure and sore throat.    Eyes: Negative for visual disturbance.   Respiratory: Negative for cough and shortness of breath.    Cardiovascular: Negative for chest pain.   Gastrointestinal: Negative for diarrhea and nausea.   Endocrine: Negative for cold intolerance and heat intolerance.   Genitourinary: Negative for dysuria.   Musculoskeletal: Negative for arthralgias and myalgias.   Skin: Negative for rash.   Allergic/Immunologic: Negative for immunocompromised state.   Neurological:        See HPI   Hematological: Does not bruise/bleed easily.   Psychiatric/Behavioral: Negative for hallucinations.      General: alert and oriented, no acute distress, no audible wheezes, pulse intact, no edema    Vitals:    11/30/22 1030   BP: 122/86   Pulse: 84        Cognition and Comprehension  Speech and language intact  Follows commands  Speech fluent  Attention  intact  Memory for recent events intact from history taking  Affect pleasant  Fund of knowledge adequate    Left spasmodic torticollis and retrocollis    Cranial nerves  II. Optic: Visual fields full to confrontation both eyes  III, IV, VI. Oculomotor: Intact, Pupils equal, round and reactive to light, no nystagmus  V. Trigeminal: sensation to light touch normal  VII. No facial asymmetry or facial weakness  VIII. Hearing intact to spoken voice  IX/X. Glossopharyngeal/Vagus: Voice normal, palate rises symmetrically  XI. Axillary: Shoulder shrug normal  XII. Hypoglossal: Intact    Muscle Strength and Tone  Normal upper extremity tone  Normal lower extremity tone  Normal upper extremity strength  Normal lower extremity strength    Sensation  Intact to light touch and temperature    Reflexes  Normal and symmetric    Coordination and Gait  Finger to nose normal  Gait normal       Larry was seen today for cervical dystonia.    Diagnoses and all orders for this visit:    Tardive dystonia  -     benztropine (COGENTIN) 0.5 MG tablet; Take 1 tablet (0.5 mg total) by mouth 2 (two) times daily.    Cervical dystonia  -     Ambulatory referral/consult to Neurology  -     benztropine (COGENTIN) 0.5 MG tablet; Take 1 tablet (0.5 mg total) by mouth 2 (two) times daily.       Likely med related, trial of cogentin, but Botox recommended, patient does not want, revisit this in 1 month  Ingrezza could be tried

## 2022-12-08 ENCOUNTER — OFFICE VISIT (OUTPATIENT)
Dept: HEMATOLOGY/ONCOLOGY | Facility: CLINIC | Age: 52
End: 2022-12-08
Payer: MEDICARE

## 2022-12-08 VITALS
DIASTOLIC BLOOD PRESSURE: 82 MMHG | WEIGHT: 195.75 LBS | OXYGEN SATURATION: 100 % | RESPIRATION RATE: 20 BRPM | HEART RATE: 83 BPM | HEIGHT: 68 IN | BODY MASS INDEX: 29.67 KG/M2 | TEMPERATURE: 97 F | SYSTOLIC BLOOD PRESSURE: 123 MMHG

## 2022-12-08 DIAGNOSIS — D70.9 NEUTROPENIA, UNSPECIFIED TYPE: ICD-10-CM

## 2022-12-08 DIAGNOSIS — D72.819 LEUKOPENIA, UNSPECIFIED TYPE: Primary | ICD-10-CM

## 2022-12-08 PROCEDURE — 99213 OFFICE O/P EST LOW 20 MIN: CPT | Mod: S$PBB,,, | Performed by: INTERNAL MEDICINE

## 2022-12-08 PROCEDURE — 99214 OFFICE O/P EST MOD 30 MIN: CPT | Mod: PBBFAC | Performed by: INTERNAL MEDICINE

## 2022-12-08 PROCEDURE — 99213 PR OFFICE/OUTPT VISIT, EST, LEVL III, 20-29 MIN: ICD-10-PCS | Mod: S$PBB,,, | Performed by: INTERNAL MEDICINE
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
## 2022-12-15 ENCOUNTER — OFFICE VISIT (OUTPATIENT)
Dept: UROLOGY | Facility: CLINIC | Age: 52
End: 2022-12-15
Payer: MEDICARE

## 2022-12-15 VITALS
HEART RATE: 106 BPM | HEIGHT: 68 IN | SYSTOLIC BLOOD PRESSURE: 100 MMHG | OXYGEN SATURATION: 96 % | DIASTOLIC BLOOD PRESSURE: 68 MMHG | TEMPERATURE: 98 F | WEIGHT: 195 LBS | BODY MASS INDEX: 29.55 KG/M2

## 2022-12-15 DIAGNOSIS — N40.1 BPH WITH OBSTRUCTION/LOWER URINARY TRACT SYMPTOMS: Primary | ICD-10-CM

## 2022-12-15 DIAGNOSIS — N13.8 BPH WITH OBSTRUCTION/LOWER URINARY TRACT SYMPTOMS: Primary | ICD-10-CM

## 2022-12-15 DIAGNOSIS — Z87.898 HISTORY OF URINARY RETENTION: ICD-10-CM

## 2022-12-15 LAB — POC RESIDUAL URINE VOLUME: 91 ML (ref 0–100)

## 2022-12-15 PROCEDURE — 99214 OFFICE O/P EST MOD 30 MIN: CPT | Mod: PBBFAC | Performed by: UROLOGY

## 2022-12-15 PROCEDURE — 99213 PR OFFICE/OUTPT VISIT, EST, LEVL III, 20-29 MIN: ICD-10-PCS | Mod: S$PBB,,, | Performed by: UROLOGY

## 2022-12-15 PROCEDURE — 51798 US URINE CAPACITY MEASURE: CPT | Mod: PBBFAC | Performed by: UROLOGY

## 2022-12-15 PROCEDURE — 99213 OFFICE O/P EST LOW 20 MIN: CPT | Mod: S$PBB,,, | Performed by: UROLOGY

## 2022-12-15 RX ORDER — TAMSULOSIN HYDROCHLORIDE 0.4 MG/1
1 CAPSULE ORAL DAILY
Qty: 90 CAPSULE | Refills: 3 | Status: SHIPPED | OUTPATIENT
Start: 2022-12-15 | End: 2023-06-15 | Stop reason: SDUPTHER

## 2022-12-15 NOTE — PROGRESS NOTES
Patient seen by Dr. NADINE Adame. Will return in 6 months with bladder scan. Written and verbal discharge instructions given.

## 2022-12-20 NOTE — PROGRESS NOTES
CC:  Follow-up    HPI:  Larry Riggs is a 52 y.o. male seen for follow-up of incomplete emptying.  The patient has a history of urinary retention and was doing intermittent catheterization.  He was only getting out postvoid volumes of 100 mL or less.  I instructed him at the last visit to stop doing the self catheterizations.  He is doing well not catheterizing and feels like the bladder is emptying.  He is taking tamsulosin.    Bladder scan residual:  91 ml    Lab Results:    Recent Labs     11/22/22  1152   CREATININE 1.24*    PSA - 9 November 2021:  0.76    ROS:  All systems reviewed and are negative except as documented in HPI and/or Assessment and Plan.     Patient Active Problem List:     Patient Active Problem List   Diagnosis    Encephalopathy    Leukopenia    Neutropenia        Past Medical History:  Past Medical History:   Diagnosis Date    GERD (gastroesophageal reflux disease)     Hypertension         Past Surgical History:  Past Surgical History:   Procedure Laterality Date    ANKLE SURGERY Right     KNEE SURGERY Left     neck fusion N/A         Family History:  Family History   Problem Relation Age of Onset    Hypertension Father     Hypertension Mother         Social History:  Social History     Socioeconomic History    Marital status:    Tobacco Use    Smoking status: Never    Smokeless tobacco: Never   Substance and Sexual Activity    Alcohol use: Never    Drug use: Never        Allergies:  Review of patient's allergies indicates:  No Known Allergies     Objective:  Vitals:    12/15/22 0937   BP: 100/68   Pulse: 106   Temp: 98.3 °F (36.8 °C)     General:  Well developed, well nourished adult male in no acute distress  Abdomen: Soft, nontender, no masses  Extremities:  No clubbing, cyanosis, or edema  Neurologic:  Grossly intact  Musculoskeletal:  Normal tone    Assessment:  1. BPH with obstruction/lower urinary tract symptoms  - POCT Bladder Scan  - tamsulosin (FLOMAX) 0.4 mg Cap; Take  1 capsule (0.4 mg total) by mouth once daily.  Dispense: 90 capsule; Refill: 3    2. History of urinary retention  - POCT Bladder Scan  - tamsulosin (FLOMAX) 0.4 mg Cap; Take 1 capsule (0.4 mg total) by mouth once daily.  Dispense: 90 capsule; Refill: 3     Plan:  Continue tamsulosin.  Prescription given today.  Continue tamsulosin.  Prescription given today.    Follow-up:  Six months for bladder scan and he will need a PSA at that time.

## 2022-12-28 ENCOUNTER — OFFICE VISIT (OUTPATIENT)
Dept: NEUROLOGY | Facility: CLINIC | Age: 52
End: 2022-12-28
Payer: MEDICARE

## 2022-12-28 VITALS
HEIGHT: 68 IN | WEIGHT: 197 LBS | DIASTOLIC BLOOD PRESSURE: 74 MMHG | HEART RATE: 96 BPM | SYSTOLIC BLOOD PRESSURE: 122 MMHG | BODY MASS INDEX: 29.86 KG/M2

## 2022-12-28 DIAGNOSIS — G24.01 TARDIVE DYSTONIA: ICD-10-CM

## 2022-12-28 PROCEDURE — 99214 OFFICE O/P EST MOD 30 MIN: CPT | Mod: S$PBB,,, | Performed by: NURSE PRACTITIONER

## 2022-12-28 PROCEDURE — 99213 OFFICE O/P EST LOW 20 MIN: CPT | Mod: PBBFAC | Performed by: NURSE PRACTITIONER

## 2022-12-28 PROCEDURE — 99999 PR PBB SHADOW E&M-EST. PATIENT-LVL III: ICD-10-PCS | Mod: PBBFAC,,, | Performed by: NURSE PRACTITIONER

## 2022-12-28 PROCEDURE — 99999 PR PBB SHADOW E&M-EST. PATIENT-LVL III: CPT | Mod: PBBFAC,,, | Performed by: NURSE PRACTITIONER

## 2022-12-28 PROCEDURE — 99214 PR OFFICE/OUTPT VISIT, EST, LEVL IV, 30-39 MIN: ICD-10-PCS | Mod: S$PBB,,, | Performed by: NURSE PRACTITIONER

## 2022-12-28 RX ORDER — VALBENAZINE 40 MG-80MG
KIT ORAL
Qty: 29 CAPSULE | Refills: 5 | Status: SHIPPED | OUTPATIENT
Start: 2022-12-28 | End: 2023-01-25

## 2022-12-28 NOTE — PROGRESS NOTES
Subjective:       Patient ID: Larry Riggs is a 52 y.o. male.    Chief Complaint: Cervical dystonia (Started benztropine 0.5 mg BID at last visit denies changes or improvement since start; denies progression of pain or difficulty with function)      History of Present Illness:  Follow-up visit for tardive dystonia.  He was started on Cogentin at his last visit.  Since starting Cogentin, he has not noticed any improvement in his neck pain or range of motion.  He still feels his neck turns to the left at baseline.  Turning his neck exacerbates the pain.  He tells me he also notices unintentional jaw movement essentially all day every day.    Still not interested in Botox.      Review of Systems  I have reviewed a 12 point review of systems which is negative unless otherwise stated in HPI        Outpatient Encounter Medications as of 12/28/2022   Medication Sig Dispense Refill    atorvastatin (LIPITOR) 80 MG tablet Take 80 mg by mouth once daily.      clonazePAM (KLONOPIN) 1 MG tablet Take 0.5 mg by mouth 2 (two) times daily.      DULoxetine (CYMBALTA) 30 MG capsule Take 30 mg by mouth 2 (two) times daily.      finasteride (PROSCAR) 5 mg tablet Take 5 mg by mouth once daily.      lisinopriL-hydrochlorothiazide (PRINZIDE,ZESTORETIC) 20-25 mg Tab Take 1 tablet by mouth once daily.      paroxetine (PAXIL) 40 MG tablet Take 30 mg by mouth once daily.      QUEtiapine (SEROQUEL) 300 MG Tab Take 300 mg by mouth every evening.      risperiDONE (RISPERDAL) 4 MG tablet Take 4 mg by mouth 2 (two) times daily.      tamsulosin (FLOMAX) 0.4 mg Cap Take 1 capsule (0.4 mg total) by mouth once daily. 90 capsule 3    valACYclovir (VALTREX) 500 MG tablet Take 500 mg by mouth as needed.      [DISCONTINUED] benztropine (COGENTIN) 0.5 MG tablet Take 1 tablet (0.5 mg total) by mouth 2 (two) times daily. 60 tablet 0    valbenazine (INGREZZA INITIATION PACK) 40 mg (7)- 80 mg (21) CpPk Take 40 mg by mouth once daily for 7 days, THEN 80 mg  "once daily for 21 days. 29 capsule 5     No facility-administered encounter medications on file as of 12/28/2022.      Objective:   /74   Pulse 96   Ht 5' 8" (1.727 m)   Wt 89.4 kg (197 lb)   BMI 29.95 kg/m²          Physical Exam  General:  Alert and oriented  NAD  No overt edema    Left spasmodic torticollis and retrocollis  Occasional jaw dyskinesia noted with distraction    Cognition and Comprehension:  Speech and language intact  Follows commands    Cranial nerves:   CN 2_ Visual fields (full to confrontation both eyes)  CN 3, 4, 6_ Intact,  no nystagmus  CN 5_facial tactile sensation intact  CN 7_no face asymmetry; normal eye closure and smile  CN 8_hearing intact to spoken voice  CN 9, 10, 11_voice normal, shoulder shrug ok; deltoids not fatigable   CN 12 tongue_protrudes mid line    Muscle Strength and Tone:  Normal upper extremity tone  Normal lower extremity tone  Normal upper extremity strength  Normal lower extremity strength  No bradykinesia    Coordination and Gait:  Coordination and gait are normal       Assessment & Plan:      1. Tardive dystonia  Overview:  Suspected medication related tardive dystonia that started over 10 years ago.  Current psychiatric medications include Cymbalta, Paxil, Seroquel, and risperidone.  He tried Cogentin, but did not see any benefit.  He also tried Botox with Dr. Nina.    Assessment & Plan:  -Stop Cogentin.  Trial of Ingrezza  -rtc 3 mos    Orders:  -     valbenazine (INGREZZA INITIATION PACK) 40 mg (7)- 80 mg (21) CpPk; Take 40 mg by mouth once daily for 7 days, THEN 80 mg once daily for 21 days.  Dispense: 29 capsule; Refill: 5          This note was created with the assistance of voice recognition software. There may be transcription errors as a result of using this technology however minimal. Effort has been made to assure accuracy of transcription but any obvious errors or omissions should be clarified with the author of the document.      "

## 2023-03-07 ENCOUNTER — TELEPHONE (OUTPATIENT)
Dept: HEMATOLOGY/ONCOLOGY | Facility: CLINIC | Age: 53
End: 2023-03-07
Payer: MEDICARE

## 2023-03-08 ENCOUNTER — OFFICE VISIT (OUTPATIENT)
Dept: HEMATOLOGY/ONCOLOGY | Facility: CLINIC | Age: 53
End: 2023-03-08
Payer: COMMERCIAL

## 2023-03-08 VITALS
DIASTOLIC BLOOD PRESSURE: 78 MMHG | HEIGHT: 67 IN | HEART RATE: 77 BPM | BODY MASS INDEX: 30.92 KG/M2 | TEMPERATURE: 99 F | RESPIRATION RATE: 20 BRPM | WEIGHT: 197 LBS | OXYGEN SATURATION: 98 % | SYSTOLIC BLOOD PRESSURE: 120 MMHG

## 2023-03-08 DIAGNOSIS — D72.819 LEUKOPENIA, UNSPECIFIED TYPE: Primary | ICD-10-CM

## 2023-03-08 DIAGNOSIS — D70.9 NEUTROPENIA, UNSPECIFIED TYPE: ICD-10-CM

## 2023-03-08 PROCEDURE — 99213 PR OFFICE/OUTPT VISIT, EST, LEVL III, 20-29 MIN: ICD-10-PCS | Mod: S$PBB,,, | Performed by: INTERNAL MEDICINE

## 2023-03-08 PROCEDURE — 99213 OFFICE O/P EST LOW 20 MIN: CPT | Mod: S$PBB,,, | Performed by: INTERNAL MEDICINE

## 2023-03-08 PROCEDURE — 99214 OFFICE O/P EST MOD 30 MIN: CPT | Mod: PBBFAC | Performed by: INTERNAL MEDICINE
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
## 2023-03-29 ENCOUNTER — OFFICE VISIT (OUTPATIENT)
Dept: NEUROLOGY | Facility: CLINIC | Age: 53
End: 2023-03-29
Payer: COMMERCIAL

## 2023-03-29 VITALS
HEIGHT: 68 IN | HEART RATE: 88 BPM | SYSTOLIC BLOOD PRESSURE: 122 MMHG | DIASTOLIC BLOOD PRESSURE: 84 MMHG | WEIGHT: 204 LBS | BODY MASS INDEX: 30.92 KG/M2

## 2023-03-29 DIAGNOSIS — G24.01 TARDIVE DYSTONIA: Primary | ICD-10-CM

## 2023-03-29 PROCEDURE — 99213 PR OFFICE/OUTPT VISIT, EST, LEVL III, 20-29 MIN: ICD-10-PCS | Mod: S$GLB,,, | Performed by: NURSE PRACTITIONER

## 2023-03-29 PROCEDURE — 99999 PR PBB SHADOW E&M-EST. PATIENT-LVL IV: ICD-10-PCS | Mod: PBBFAC,,, | Performed by: NURSE PRACTITIONER

## 2023-03-29 PROCEDURE — 99213 OFFICE O/P EST LOW 20 MIN: CPT | Mod: S$GLB,,, | Performed by: NURSE PRACTITIONER

## 2023-03-29 PROCEDURE — 99999 PR PBB SHADOW E&M-EST. PATIENT-LVL IV: CPT | Mod: PBBFAC,,, | Performed by: NURSE PRACTITIONER

## 2023-03-29 PROCEDURE — 99214 OFFICE O/P EST MOD 30 MIN: CPT | Mod: PBBFAC | Performed by: NURSE PRACTITIONER

## 2023-03-29 RX ORDER — VALBENAZINE 80 MG/1
80 CAPSULE ORAL DAILY
Qty: 90 CAPSULE | Refills: 2 | Status: SHIPPED | OUTPATIENT
Start: 2023-03-29 | End: 2023-11-27

## 2023-03-29 RX ORDER — VALBENAZINE 80 MG/1
80 CAPSULE ORAL DAILY
COMMUNITY
End: 2023-03-29 | Stop reason: SDUPTHER

## 2023-03-29 NOTE — ASSESSMENT & PLAN NOTE
-Sig improvement in dyskinesias, mild improvement in dystonia.  Will hopefully see progressive improvement with dystonia with continued use.  Continue ingrezza 80 mg daily

## 2023-03-29 NOTE — PROGRESS NOTES
Subjective:       Patient ID: Larry Riggs is a 52 y.o. male.    Chief Complaint: Tardive dystonia (Since he started Ingrezza he has notice a little improvement to movement in mouth and neck. Reports continues to have tightness to neck.)      History of Present Illness:  Follow-up visit for tardive dystonia.  He was started on Ingrezza at last visit.  Since starting Ingrezza, he has noticed significant improvement in his jaw dyskinesias.  Thinks he has seen some mild improvement in his neck dystonia, but he still notices a lot of tightness in his neck that worsens throughout the day.  He does note that the dystonia feels great in the morning.  He has no pain and does not feel tight.  Throughout the day, as he becomes more active, it becomes more pronounced.  Overall, though, he feels like he has seen an improvement.  He is currently taking 80 mg daily.  Denies any gait changes or rest tremor on the medication.      Review of Systems  I have reviewed a 12 point review of systems which is negative unless otherwise stated in HPI        Past Medical History:   Diagnosis Date    Anxiety disorder, unspecified     Cervical dystonia     Depression     GERD (gastroesophageal reflux disease)     Hypertension        Past Surgical History:   Procedure Laterality Date    ANKLE SURGERY Right     KNEE SURGERY Left     neck fusion N/A         Family History   Problem Relation Age of Onset    Hypertension Father     Hypertension Mother         Social History     Socioeconomic History    Marital status:    Tobacco Use    Smoking status: Never    Smokeless tobacco: Never   Substance and Sexual Activity    Alcohol use: Not Currently    Drug use: Yes     Types: Marijuana     Comment: Medical        Outpatient Encounter Medications as of 3/29/2023   Medication Sig Dispense Refill    atorvastatin (LIPITOR) 80 MG tablet Take 80 mg by mouth once daily.      clonazePAM (KLONOPIN) 1 MG tablet Take 0.5 mg by mouth 3 (three) times  "daily.      DULoxetine (CYMBALTA) 30 MG capsule Take 30 mg by mouth 2 (two) times daily.      finasteride (PROSCAR) 5 mg tablet Take 5 mg by mouth once daily.      lisinopriL-hydrochlorothiazide (PRINZIDE,ZESTORETIC) 20-25 mg Tab Take 1 tablet by mouth once daily.      paroxetine (PAXIL) 40 MG tablet Take 30 mg by mouth once daily.      QUEtiapine (SEROQUEL) 300 MG Tab Take 300 mg by mouth every evening.      risperiDONE (RISPERDAL) 4 MG tablet Take 4 mg by mouth 2 (two) times daily.      tamsulosin (FLOMAX) 0.4 mg Cap Take 1 capsule (0.4 mg total) by mouth once daily. 90 capsule 3    valACYclovir (VALTREX) 500 MG tablet Take 500 mg by mouth as needed.      [DISCONTINUED] valbenazine (INGREZZA) 80 mg Cap Take 80 mg by mouth once daily.      valbenazine (INGREZZA) 80 mg Cap Take 80 mg by mouth once daily. 90 capsule 2     No facility-administered encounter medications on file as of 3/29/2023.      Objective:   /84 (BP Location: Left arm)   Pulse 88   Ht 5' 8" (1.727 m)   Wt 92.5 kg (204 lb)   BMI 31.02 kg/m²        Physical Exam  NAD  alert and oriented  cognition and perception intact  no aphasia  EOMI  no facial asymmetry  no dysarthria  Left spasmodic torticollis and retrocollis (seems mildly improved today, ROM better)  NO jaw dyskinesias today  No bradykinesia or UE rigidity  moves all extremities symmetrically  no gross coordination abnormalities  gait normal       Assessment & Plan:      1. Tardive dystonia  Overview:  Suspected medication related tardive dystonia that started over 10 years ago.  Current psychiatric medications include Cymbalta, Paxil, Seroquel, and risperidone.  He tried Cogentin, but did not see any benefit.  He also tried Botox with Dr. Nina.    Assessment & Plan:  -Sig improvement in dyskinesias, mild improvement in dystonia.  Will hopefully see progressive improvement with dystonia with continued use.  Continue ingrezza 80 mg daily    Orders:  -     valbenazine (INGREZZA) 80 mg " Cap; Take 80 mg by mouth once daily.  Dispense: 90 capsule; Refill: 2          This note was created with the assistance of voice recognition software. There may be transcription errors as a result of using this technology however minimal. Effort has been made to assure accuracy of transcription but any obvious errors or omissions should be clarified with the author of the document.

## 2023-06-12 DIAGNOSIS — N13.8 BPH WITH OBSTRUCTION/LOWER URINARY TRACT SYMPTOMS: Primary | ICD-10-CM

## 2023-06-12 DIAGNOSIS — N40.1 BPH WITH OBSTRUCTION/LOWER URINARY TRACT SYMPTOMS: Primary | ICD-10-CM

## 2023-06-13 ENCOUNTER — LAB VISIT (OUTPATIENT)
Dept: LAB | Facility: HOSPITAL | Age: 53
End: 2023-06-13
Attending: UROLOGY
Payer: COMMERCIAL

## 2023-06-13 DIAGNOSIS — N40.1 BPH WITH OBSTRUCTION/LOWER URINARY TRACT SYMPTOMS: ICD-10-CM

## 2023-06-13 DIAGNOSIS — N13.8 BPH WITH OBSTRUCTION/LOWER URINARY TRACT SYMPTOMS: ICD-10-CM

## 2023-06-13 LAB — PSA SERPL-MCNC: 2.1 NG/ML

## 2023-06-13 PROCEDURE — 84153 ASSAY OF PSA TOTAL: CPT

## 2023-06-13 PROCEDURE — 36415 COLL VENOUS BLD VENIPUNCTURE: CPT

## 2023-06-15 ENCOUNTER — OFFICE VISIT (OUTPATIENT)
Dept: UROLOGY | Facility: CLINIC | Age: 53
End: 2023-06-15
Payer: COMMERCIAL

## 2023-06-15 VITALS
HEIGHT: 68 IN | TEMPERATURE: 98 F | WEIGHT: 200.38 LBS | OXYGEN SATURATION: 99 % | HEART RATE: 83 BPM | SYSTOLIC BLOOD PRESSURE: 120 MMHG | BODY MASS INDEX: 30.37 KG/M2 | DIASTOLIC BLOOD PRESSURE: 78 MMHG

## 2023-06-15 DIAGNOSIS — R97.20 RISING PSA LEVEL: ICD-10-CM

## 2023-06-15 DIAGNOSIS — Z87.898 HISTORY OF URINARY RETENTION: ICD-10-CM

## 2023-06-15 DIAGNOSIS — N13.8 BPH WITH OBSTRUCTION/LOWER URINARY TRACT SYMPTOMS: Primary | ICD-10-CM

## 2023-06-15 DIAGNOSIS — N40.1 BPH WITH OBSTRUCTION/LOWER URINARY TRACT SYMPTOMS: Primary | ICD-10-CM

## 2023-06-15 PROCEDURE — 3008F PR BODY MASS INDEX (BMI) DOCUMENTED: ICD-10-PCS | Mod: CPTII,,, | Performed by: UROLOGY

## 2023-06-15 PROCEDURE — 1160F RVW MEDS BY RX/DR IN RCRD: CPT | Mod: CPTII,,, | Performed by: UROLOGY

## 2023-06-15 PROCEDURE — 3078F PR MOST RECENT DIASTOLIC BLOOD PRESSURE < 80 MM HG: ICD-10-PCS | Mod: CPTII,,, | Performed by: UROLOGY

## 2023-06-15 PROCEDURE — 99213 PR OFFICE/OUTPT VISIT, EST, LEVL III, 20-29 MIN: ICD-10-PCS | Mod: S$PBB,,, | Performed by: UROLOGY

## 2023-06-15 PROCEDURE — 3008F BODY MASS INDEX DOCD: CPT | Mod: CPTII,,, | Performed by: UROLOGY

## 2023-06-15 PROCEDURE — 99214 OFFICE O/P EST MOD 30 MIN: CPT | Mod: PBBFAC | Performed by: UROLOGY

## 2023-06-15 PROCEDURE — 1159F PR MEDICATION LIST DOCUMENTED IN MEDICAL RECORD: ICD-10-PCS | Mod: CPTII,,, | Performed by: UROLOGY

## 2023-06-15 PROCEDURE — 3078F DIAST BP <80 MM HG: CPT | Mod: CPTII,,, | Performed by: UROLOGY

## 2023-06-15 PROCEDURE — 3074F SYST BP LT 130 MM HG: CPT | Mod: CPTII,,, | Performed by: UROLOGY

## 2023-06-15 PROCEDURE — 4010F ACE/ARB THERAPY RXD/TAKEN: CPT | Mod: CPTII,,, | Performed by: UROLOGY

## 2023-06-15 PROCEDURE — 3074F PR MOST RECENT SYSTOLIC BLOOD PRESSURE < 130 MM HG: ICD-10-PCS | Mod: CPTII,,, | Performed by: UROLOGY

## 2023-06-15 PROCEDURE — 1160F PR REVIEW ALL MEDS BY PRESCRIBER/CLIN PHARMACIST DOCUMENTED: ICD-10-PCS | Mod: CPTII,,, | Performed by: UROLOGY

## 2023-06-15 PROCEDURE — 4010F PR ACE/ARB THEARPY RXD/TAKEN: ICD-10-PCS | Mod: CPTII,,, | Performed by: UROLOGY

## 2023-06-15 PROCEDURE — 1159F MED LIST DOCD IN RCRD: CPT | Mod: CPTII,,, | Performed by: UROLOGY

## 2023-06-15 PROCEDURE — 99213 OFFICE O/P EST LOW 20 MIN: CPT | Mod: S$PBB,,, | Performed by: UROLOGY

## 2023-06-15 RX ORDER — TAMSULOSIN HYDROCHLORIDE 0.4 MG/1
1 CAPSULE ORAL DAILY
Qty: 90 CAPSULE | Refills: 3 | Status: SHIPPED | OUTPATIENT
Start: 2023-06-15 | End: 2024-03-21 | Stop reason: SDUPTHER

## 2023-06-15 RX ORDER — SILDENAFIL 100 MG/1
100 TABLET, FILM COATED ORAL
COMMUNITY
Start: 2023-05-10

## 2023-06-15 NOTE — PROGRESS NOTES
Patient seen by Dr. NADINE Adame will return in 3 months. Written and verbal discharge instructions given.

## 2023-06-15 NOTE — PROGRESS NOTES
CC:  Six month    HPI:  Larry Riggs is a 52 y.o. male seen for six month follow-up.  He has a history of urinary retention and was having to do self catheterization.  He has not catheterized since September of 2022.  He is urinating well and has no urinary complaints.  He is taking tamsulosin and needs a refill today.  He denies any family history of prostate cancer.    Bladder scan residual:  97 ml that was not a true postvoid as he was unable to give a urine sample.    Urinalysis:  He was unable to give urine sample for today's visit.    Lab Results:  PSA History:     Latest Reference Range & Units 11/09/21 00:04 06/13/23 11:19   PSA, Screen <=4.00 ng/mL 0.76 2.10       ROS:  All systems reviewed and are negative except as documented in HPI and/or Assessment and Plan.     Patient Active Problem List:     Patient Active Problem List   Diagnosis    Encephalopathy    Leukopenia    Neutropenia    Tardive dystonia        Past Medical History:  Past Medical History:   Diagnosis Date    Anxiety disorder, unspecified     Cervical dystonia     Depression     GERD (gastroesophageal reflux disease)     Hypertension         Past Surgical History:  Past Surgical History:   Procedure Laterality Date    ANKLE SURGERY Right     KNEE SURGERY Left     neck fusion N/A         Family History:  Family History   Problem Relation Age of Onset    Hypertension Father     Hypertension Mother         Social History:  Social History     Socioeconomic History    Marital status:    Tobacco Use    Smoking status: Never    Smokeless tobacco: Never   Substance and Sexual Activity    Alcohol use: Not Currently    Drug use: Yes     Types: Marijuana     Comment: Medical        Allergies:  Review of patient's allergies indicates:  No Known Allergies     Objective:  Vitals:    06/15/23 0937   BP: 120/78   Pulse: 83   Temp: 98.4 °F (36.9 °C)     General:  Well developed, well nourished adult male in no acute distress  Abdomen: Soft,  nontender, no masses  Extremities:  No clubbing, cyanosis, or edema  Neurologic:  Grossly intact  Musculoskeletal:  Normal tone    Assessment:  1. BPH with obstruction/lower urinary tract symptoms  - tamsulosin (FLOMAX) 0.4 mg Cap; Take 1 capsule (0.4 mg total) by mouth once daily.  Dispense: 90 capsule; Refill: 3    2. Rising PSA level  - POCT URINE DIPSTICK WITH MICROSCOPE, AUTOMATED  - PSA, Total (Diagnostic); Future    3. History of urinary retention     Plan:  Continue tamsulosin.  Refill given today.  The PSA, while normal, has risen over the last 18 months little more than expected.  Will follow this with a repeat PSA in three months.  He needs a EDI at that visit.  Urinary retention has resolved.    Follow-up:  PSA in three months and follow-up after.

## 2023-08-24 ENCOUNTER — OFFICE VISIT (OUTPATIENT)
Dept: NEUROLOGY | Facility: CLINIC | Age: 53
End: 2023-08-24
Payer: COMMERCIAL

## 2023-08-24 VITALS
BODY MASS INDEX: 30.92 KG/M2 | RESPIRATION RATE: 18 BRPM | HEIGHT: 68 IN | SYSTOLIC BLOOD PRESSURE: 130 MMHG | WEIGHT: 204 LBS | DIASTOLIC BLOOD PRESSURE: 78 MMHG

## 2023-08-24 DIAGNOSIS — G24.01 TARDIVE DYSTONIA: Primary | ICD-10-CM

## 2023-08-24 PROCEDURE — 99214 PR OFFICE/OUTPT VISIT, EST, LEVL IV, 30-39 MIN: ICD-10-PCS | Mod: S$GLB,,, | Performed by: PSYCHIATRY & NEUROLOGY

## 2023-08-24 PROCEDURE — 4010F PR ACE/ARB THEARPY RXD/TAKEN: ICD-10-PCS | Mod: CPTII,S$GLB,, | Performed by: PSYCHIATRY & NEUROLOGY

## 2023-08-24 PROCEDURE — 3008F PR BODY MASS INDEX (BMI) DOCUMENTED: ICD-10-PCS | Mod: CPTII,S$GLB,, | Performed by: PSYCHIATRY & NEUROLOGY

## 2023-08-24 PROCEDURE — 3075F SYST BP GE 130 - 139MM HG: CPT | Mod: CPTII,S$GLB,, | Performed by: PSYCHIATRY & NEUROLOGY

## 2023-08-24 PROCEDURE — 3008F BODY MASS INDEX DOCD: CPT | Mod: CPTII,S$GLB,, | Performed by: PSYCHIATRY & NEUROLOGY

## 2023-08-24 PROCEDURE — 4010F ACE/ARB THERAPY RXD/TAKEN: CPT | Mod: CPTII,S$GLB,, | Performed by: PSYCHIATRY & NEUROLOGY

## 2023-08-24 PROCEDURE — 99999 PR PBB SHADOW E&M-EST. PATIENT-LVL III: CPT | Mod: PBBFAC,,, | Performed by: PSYCHIATRY & NEUROLOGY

## 2023-08-24 PROCEDURE — 99214 OFFICE O/P EST MOD 30 MIN: CPT | Mod: S$GLB,,, | Performed by: PSYCHIATRY & NEUROLOGY

## 2023-08-24 PROCEDURE — 1159F MED LIST DOCD IN RCRD: CPT | Mod: CPTII,S$GLB,, | Performed by: PSYCHIATRY & NEUROLOGY

## 2023-08-24 PROCEDURE — 1159F PR MEDICATION LIST DOCUMENTED IN MEDICAL RECORD: ICD-10-PCS | Mod: CPTII,S$GLB,, | Performed by: PSYCHIATRY & NEUROLOGY

## 2023-08-24 PROCEDURE — 3078F DIAST BP <80 MM HG: CPT | Mod: CPTII,S$GLB,, | Performed by: PSYCHIATRY & NEUROLOGY

## 2023-08-24 PROCEDURE — 3075F PR MOST RECENT SYSTOLIC BLOOD PRESS GE 130-139MM HG: ICD-10-PCS | Mod: CPTII,S$GLB,, | Performed by: PSYCHIATRY & NEUROLOGY

## 2023-08-24 PROCEDURE — 3078F PR MOST RECENT DIASTOLIC BLOOD PRESSURE < 80 MM HG: ICD-10-PCS | Mod: CPTII,S$GLB,, | Performed by: PSYCHIATRY & NEUROLOGY

## 2023-08-24 PROCEDURE — 99999 PR PBB SHADOW E&M-EST. PATIENT-LVL III: ICD-10-PCS | Mod: PBBFAC,,, | Performed by: PSYCHIATRY & NEUROLOGY

## 2023-08-24 NOTE — PROGRESS NOTES
Chief Complaint   Patient presents with    Agitation    Follow-up     4 month follow up   Patient c/o pain and stiffness in his neck         This is a 52 y.o. male here for follow up for tardive dystonia of the neck.  Patient has been on Ingrezza 80 without any improvement.  He reports pain in his neck radiating into his back.  It sounded like initially Ingrezza helped but then it stopped working.  Recall patient has tried Botox injections with Dr. kendrick which he reported causes anxiety on 3 different occasions.  Medication List with Changes/Refills   Current Medications    ATORVASTATIN (LIPITOR) 80 MG TABLET    Take 80 mg by mouth once daily.    CLONAZEPAM (KLONOPIN) 1 MG TABLET    Take 0.5 mg by mouth 3 (three) times daily.    DULOXETINE (CYMBALTA) 30 MG CAPSULE    Take 30 mg by mouth 2 (two) times daily.    FINASTERIDE (PROSCAR) 5 MG TABLET    Take 5 mg by mouth once daily.    LISINOPRIL-HYDROCHLOROTHIAZIDE (PRINZIDE,ZESTORETIC) 20-25 MG TAB    Take 1 tablet by mouth once daily.    PAROXETINE (PAXIL) 40 MG TABLET    Take 30 mg by mouth once daily.    QUETIAPINE (SEROQUEL) 300 MG TAB    Take 300 mg by mouth every evening.    RISPERIDONE (RISPERDAL) 4 MG TABLET    Take 4 mg by mouth 2 (two) times daily.    SILDENAFIL (VIAGRA) 100 MG TABLET    Take 100 mg by mouth.    TAMSULOSIN (FLOMAX) 0.4 MG CAP    Take 1 capsule (0.4 mg total) by mouth once daily.    VALACYCLOVIR (VALTREX) 500 MG TABLET    Take 500 mg by mouth as needed.    VALBENAZINE (INGREZZA) 80 MG CAP    Take 80 mg by mouth once daily.        Vitals:    08/24/23 0811   BP: 130/78   Resp: 18        Physical Exam  NAD  alert and oriented  cognition and perception intact  no aphasia  EOMI  no facial asymmetry  no dysarthria  Left spasmodic torticollis and retrocollis   NO jaw dyskinesias today  No bradykinesia or UE rigidity  moves all extremities symmetrically  no gross coordination abnormalities  gait normal        1. Tardive dystonia  Overview:  Suspected  medication related tardive dystonia that started over 10 years ago.  Current psychiatric medications include Cymbalta, Paxil, Seroquel, and risperidone.  He tried Cogentin, but did not see any benefit.  He also tried Botox with Dr. Nina.         We will try austedo.  Stops Ingrezza.  Discussed considering a movement disorders referral.  Topamax would also be another option.
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
## 2023-09-19 ENCOUNTER — OFFICE VISIT (OUTPATIENT)
Dept: HEMATOLOGY/ONCOLOGY | Facility: CLINIC | Age: 53
End: 2023-09-19
Attending: INTERNAL MEDICINE
Payer: COMMERCIAL

## 2023-09-19 VITALS
TEMPERATURE: 98 F | HEIGHT: 68 IN | OXYGEN SATURATION: 100 % | DIASTOLIC BLOOD PRESSURE: 81 MMHG | RESPIRATION RATE: 20 BRPM | SYSTOLIC BLOOD PRESSURE: 124 MMHG | BODY MASS INDEX: 31.16 KG/M2 | HEART RATE: 70 BPM | WEIGHT: 205.63 LBS

## 2023-09-19 DIAGNOSIS — D70.9 NEUTROPENIA, UNSPECIFIED TYPE: ICD-10-CM

## 2023-09-19 DIAGNOSIS — D72.819 LEUKOPENIA, UNSPECIFIED TYPE: Primary | ICD-10-CM

## 2023-09-19 PROCEDURE — 1160F PR REVIEW ALL MEDS BY PRESCRIBER/CLIN PHARMACIST DOCUMENTED: ICD-10-PCS | Mod: CPTII,,, | Performed by: INTERNAL MEDICINE

## 2023-09-19 PROCEDURE — 4010F PR ACE/ARB THEARPY RXD/TAKEN: ICD-10-PCS | Mod: CPTII,,, | Performed by: INTERNAL MEDICINE

## 2023-09-19 PROCEDURE — 1159F PR MEDICATION LIST DOCUMENTED IN MEDICAL RECORD: ICD-10-PCS | Mod: CPTII,,, | Performed by: INTERNAL MEDICINE

## 2023-09-19 PROCEDURE — 99213 PR OFFICE/OUTPT VISIT, EST, LEVL III, 20-29 MIN: ICD-10-PCS | Mod: S$PBB,,, | Performed by: INTERNAL MEDICINE

## 2023-09-19 PROCEDURE — 3079F DIAST BP 80-89 MM HG: CPT | Mod: CPTII,,, | Performed by: INTERNAL MEDICINE

## 2023-09-19 PROCEDURE — 1159F MED LIST DOCD IN RCRD: CPT | Mod: CPTII,,, | Performed by: INTERNAL MEDICINE

## 2023-09-19 PROCEDURE — 4010F ACE/ARB THERAPY RXD/TAKEN: CPT | Mod: CPTII,,, | Performed by: INTERNAL MEDICINE

## 2023-09-19 PROCEDURE — 99213 OFFICE O/P EST LOW 20 MIN: CPT | Mod: S$PBB,,, | Performed by: INTERNAL MEDICINE

## 2023-09-19 PROCEDURE — 1160F RVW MEDS BY RX/DR IN RCRD: CPT | Mod: CPTII,,, | Performed by: INTERNAL MEDICINE

## 2023-09-19 PROCEDURE — 99214 OFFICE O/P EST MOD 30 MIN: CPT | Mod: PBBFAC | Performed by: INTERNAL MEDICINE

## 2023-09-19 PROCEDURE — 3008F PR BODY MASS INDEX (BMI) DOCUMENTED: ICD-10-PCS | Mod: CPTII,,, | Performed by: INTERNAL MEDICINE

## 2023-09-19 PROCEDURE — 3079F PR MOST RECENT DIASTOLIC BLOOD PRESSURE 80-89 MM HG: ICD-10-PCS | Mod: CPTII,,, | Performed by: INTERNAL MEDICINE

## 2023-09-19 PROCEDURE — 3074F SYST BP LT 130 MM HG: CPT | Mod: CPTII,,, | Performed by: INTERNAL MEDICINE

## 2023-09-19 PROCEDURE — 3008F BODY MASS INDEX DOCD: CPT | Mod: CPTII,,, | Performed by: INTERNAL MEDICINE

## 2023-09-19 PROCEDURE — 3074F PR MOST RECENT SYSTOLIC BLOOD PRESSURE < 130 MM HG: ICD-10-PCS | Mod: CPTII,,, | Performed by: INTERNAL MEDICINE

## 2023-09-19 RX ORDER — DEUTETRABENAZINE 6 MG/1
1 TABLET, COATED ORAL 2 TIMES DAILY
COMMUNITY
Start: 2023-08-29 | End: 2023-11-27 | Stop reason: SDUPTHER

## 2023-09-21 ENCOUNTER — OFFICE VISIT (OUTPATIENT)
Dept: UROLOGY | Facility: CLINIC | Age: 53
End: 2023-09-21
Payer: COMMERCIAL

## 2023-09-21 VITALS
RESPIRATION RATE: 18 BRPM | HEART RATE: 84 BPM | SYSTOLIC BLOOD PRESSURE: 131 MMHG | DIASTOLIC BLOOD PRESSURE: 86 MMHG | BODY MASS INDEX: 30.77 KG/M2 | OXYGEN SATURATION: 98 % | WEIGHT: 203 LBS | HEIGHT: 68 IN

## 2023-09-21 DIAGNOSIS — R97.20 RISING PSA LEVEL: Primary | ICD-10-CM

## 2023-09-21 DIAGNOSIS — N13.8 BPH WITH OBSTRUCTION/LOWER URINARY TRACT SYMPTOMS: ICD-10-CM

## 2023-09-21 DIAGNOSIS — N40.1 BPH WITH OBSTRUCTION/LOWER URINARY TRACT SYMPTOMS: ICD-10-CM

## 2023-09-21 LAB
BILIRUB SERPL-MCNC: NEGATIVE MG/DL
BLOOD URINE, POC: NEGATIVE
COLOR, POC UA: YELLOW
GLUCOSE UR QL STRIP: NEGATIVE
KETONES UR QL STRIP: NEGATIVE
LEUKOCYTE ESTERASE URINE, POC: NEGATIVE
NITRITE, POC UA: NEGATIVE
PH, POC UA: 7
PROTEIN, POC: NEGATIVE
SPECIFIC GRAVITY, POC UA: 1.03
UROBILINOGEN, POC UA: 0.2

## 2023-09-21 PROCEDURE — 99214 OFFICE O/P EST MOD 30 MIN: CPT | Mod: S$PBB,,, | Performed by: UROLOGY

## 2023-09-21 PROCEDURE — 1159F PR MEDICATION LIST DOCUMENTED IN MEDICAL RECORD: ICD-10-PCS | Mod: CPTII,,, | Performed by: UROLOGY

## 2023-09-21 PROCEDURE — 99214 PR OFFICE/OUTPT VISIT, EST, LEVL IV, 30-39 MIN: ICD-10-PCS | Mod: S$PBB,,, | Performed by: UROLOGY

## 2023-09-21 PROCEDURE — 3075F SYST BP GE 130 - 139MM HG: CPT | Mod: CPTII,,, | Performed by: UROLOGY

## 2023-09-21 PROCEDURE — 3008F PR BODY MASS INDEX (BMI) DOCUMENTED: ICD-10-PCS | Mod: CPTII,,, | Performed by: UROLOGY

## 2023-09-21 PROCEDURE — 4010F PR ACE/ARB THEARPY RXD/TAKEN: ICD-10-PCS | Mod: CPTII,,, | Performed by: UROLOGY

## 2023-09-21 PROCEDURE — 4010F ACE/ARB THERAPY RXD/TAKEN: CPT | Mod: CPTII,,, | Performed by: UROLOGY

## 2023-09-21 PROCEDURE — 1160F RVW MEDS BY RX/DR IN RCRD: CPT | Mod: CPTII,,, | Performed by: UROLOGY

## 2023-09-21 PROCEDURE — 3079F PR MOST RECENT DIASTOLIC BLOOD PRESSURE 80-89 MM HG: ICD-10-PCS | Mod: CPTII,,, | Performed by: UROLOGY

## 2023-09-21 PROCEDURE — 99215 OFFICE O/P EST HI 40 MIN: CPT | Mod: PBBFAC | Performed by: UROLOGY

## 2023-09-21 PROCEDURE — 81001 URINALYSIS AUTO W/SCOPE: CPT | Mod: PBBFAC | Performed by: UROLOGY

## 2023-09-21 PROCEDURE — 3008F BODY MASS INDEX DOCD: CPT | Mod: CPTII,,, | Performed by: UROLOGY

## 2023-09-21 PROCEDURE — 1159F MED LIST DOCD IN RCRD: CPT | Mod: CPTII,,, | Performed by: UROLOGY

## 2023-09-21 PROCEDURE — 1160F PR REVIEW ALL MEDS BY PRESCRIBER/CLIN PHARMACIST DOCUMENTED: ICD-10-PCS | Mod: CPTII,,, | Performed by: UROLOGY

## 2023-09-21 PROCEDURE — 3075F PR MOST RECENT SYSTOLIC BLOOD PRESS GE 130-139MM HG: ICD-10-PCS | Mod: CPTII,,, | Performed by: UROLOGY

## 2023-09-21 PROCEDURE — 3079F DIAST BP 80-89 MM HG: CPT | Mod: CPTII,,, | Performed by: UROLOGY

## 2023-09-21 NOTE — PROGRESS NOTES
CC:  Lab Results    HPI:  Larry Riggs is a 52 y.o. male seen for follow-up of rising PSA.  He has a history of urinary retention and was having to do self catheterization.  He has not catheterized since September of 2022.  He is urinating well and has no urinary complaints.  He is taking tamsulosin.  Was noted to have a rising PSA the last visit.  It went from 0.76 in 2021 and scott to 2.10 in 2023.  He did have repeat PSA for today's visit.  He informs me today that he sees Dr. Cabrera for erectile dysfunction.    Urinalysis:  Results for orders placed or performed in visit on 09/21/23   POCT URINE DIPSTICK WITH MICROSCOPE, AUTOMATED   Result Value Ref Range    Color, UA Yellow     Spec Grav UA 1.030     pH, UA 7.0     WBC, UA Negative     Nitrite, UA Negative     Protein, POC Negative     Glucose, UA Negative     Ketones, UA Negative     Urobilinogen, UA 0.2     Bilirubin, POC Negative     Blood, UA Negative      Microscopic:  Negative      Lab Results:  PSA History:     Latest Reference Range & Units 11/09/21 00:04 06/13/23 11:19 09/19/23 15:38   PSA, Screen <=4.00 ng/mL 0.76 2.10 1.60       ROS:  All systems reviewed and are negative except as documented in HPI and/or Assessment and Plan.     Patient Active Problem List:     Patient Active Problem List   Diagnosis    Encephalopathy    Leukopenia    Neutropenia    Tardive dystonia        Past Medical History:  Past Medical History:   Diagnosis Date    Anxiety disorder, unspecified     Cervical dystonia     Depression     GERD (gastroesophageal reflux disease)     Hypertension         Past Surgical History:  Past Surgical History:   Procedure Laterality Date    ANKLE SURGERY Right     KNEE SURGERY Left     neck fusion N/A         Family History:  Family History   Problem Relation Age of Onset    Hypertension Father     Hypertension Mother         Social History:  Social History     Socioeconomic History    Marital status:    Tobacco Use    Smoking  status: Never    Smokeless tobacco: Never   Substance and Sexual Activity    Alcohol use: Not Currently    Drug use: Yes     Types: Marijuana     Comment: Medical        Allergies:  Review of patient's allergies indicates:  No Known Allergies     Objective:  Vitals:    09/21/23 1008   BP: 131/86   Pulse: 84   Resp: 18     General:  Well developed, well nourished adult male in no acute distress  Abdomen: Soft, nontender, no masses  Extremities:  No clubbing, cyanosis, or edema  Neurologic:  Grossly intact  Musculoskeletal:  Normal tone  Penis:  Circumcised, no lesions  Scrotum:  No hydrocele  Testicles:  Nontender, no masses  Epididymis:  Normal without masses  Prostate exam:  Nontender, symmetrical, no nodules  Rectal:  Normal rectal tone     Assessment:  1. Rising PSA level  - PSA, Total (Diagnostic); Future    2. BPH with obstruction/lower urinary tract symptoms  - POCT URINE DIPSTICK WITH MICROSCOPE, AUTOMATED     Plan:  The PSA came down quite a bit today.  We will continue to follow this with a repeat PSA in six months.  Continue tamsulosin.    Follow-up:  PSA in six months and follow-up after.

## 2023-11-27 ENCOUNTER — OFFICE VISIT (OUTPATIENT)
Dept: NEUROLOGY | Facility: CLINIC | Age: 53
End: 2023-11-27
Payer: COMMERCIAL

## 2023-11-27 VITALS
DIASTOLIC BLOOD PRESSURE: 80 MMHG | BODY MASS INDEX: 31.22 KG/M2 | SYSTOLIC BLOOD PRESSURE: 110 MMHG | HEART RATE: 78 BPM | HEIGHT: 68 IN | WEIGHT: 206 LBS

## 2023-11-27 DIAGNOSIS — G24.09 DRUG-INDUCED TARDIVE DYSTONIA: ICD-10-CM

## 2023-11-27 DIAGNOSIS — G24.3 CERVICAL DYSTONIA: Primary | ICD-10-CM

## 2023-11-27 DIAGNOSIS — T50.905A DRUG-INDUCED TARDIVE DYSTONIA: ICD-10-CM

## 2023-11-27 PROCEDURE — 3079F DIAST BP 80-89 MM HG: CPT | Mod: CPTII,S$GLB,, | Performed by: PSYCHIATRY & NEUROLOGY

## 2023-11-27 PROCEDURE — 3074F PR MOST RECENT SYSTOLIC BLOOD PRESSURE < 130 MM HG: ICD-10-PCS | Mod: CPTII,S$GLB,, | Performed by: PSYCHIATRY & NEUROLOGY

## 2023-11-27 PROCEDURE — 99999 PR PBB SHADOW E&M-EST. PATIENT-LVL III: ICD-10-PCS | Mod: PBBFAC,,, | Performed by: PSYCHIATRY & NEUROLOGY

## 2023-11-27 PROCEDURE — 4010F PR ACE/ARB THEARPY RXD/TAKEN: ICD-10-PCS | Mod: CPTII,S$GLB,, | Performed by: PSYCHIATRY & NEUROLOGY

## 2023-11-27 PROCEDURE — 99999 PR PBB SHADOW E&M-EST. PATIENT-LVL III: CPT | Mod: PBBFAC,,, | Performed by: PSYCHIATRY & NEUROLOGY

## 2023-11-27 PROCEDURE — 99213 PR OFFICE/OUTPT VISIT, EST, LEVL III, 20-29 MIN: ICD-10-PCS | Mod: S$GLB,,, | Performed by: PSYCHIATRY & NEUROLOGY

## 2023-11-27 PROCEDURE — 3008F PR BODY MASS INDEX (BMI) DOCUMENTED: ICD-10-PCS | Mod: CPTII,S$GLB,, | Performed by: PSYCHIATRY & NEUROLOGY

## 2023-11-27 PROCEDURE — 3079F PR MOST RECENT DIASTOLIC BLOOD PRESSURE 80-89 MM HG: ICD-10-PCS | Mod: CPTII,S$GLB,, | Performed by: PSYCHIATRY & NEUROLOGY

## 2023-11-27 PROCEDURE — 4010F ACE/ARB THERAPY RXD/TAKEN: CPT | Mod: CPTII,S$GLB,, | Performed by: PSYCHIATRY & NEUROLOGY

## 2023-11-27 PROCEDURE — 1160F PR REVIEW ALL MEDS BY PRESCRIBER/CLIN PHARMACIST DOCUMENTED: ICD-10-PCS | Mod: CPTII,S$GLB,, | Performed by: PSYCHIATRY & NEUROLOGY

## 2023-11-27 PROCEDURE — 99213 OFFICE O/P EST LOW 20 MIN: CPT | Mod: S$GLB,,, | Performed by: PSYCHIATRY & NEUROLOGY

## 2023-11-27 PROCEDURE — 3008F BODY MASS INDEX DOCD: CPT | Mod: CPTII,S$GLB,, | Performed by: PSYCHIATRY & NEUROLOGY

## 2023-11-27 PROCEDURE — 1159F MED LIST DOCD IN RCRD: CPT | Mod: CPTII,S$GLB,, | Performed by: PSYCHIATRY & NEUROLOGY

## 2023-11-27 PROCEDURE — 1159F PR MEDICATION LIST DOCUMENTED IN MEDICAL RECORD: ICD-10-PCS | Mod: CPTII,S$GLB,, | Performed by: PSYCHIATRY & NEUROLOGY

## 2023-11-27 PROCEDURE — 1160F RVW MEDS BY RX/DR IN RCRD: CPT | Mod: CPTII,S$GLB,, | Performed by: PSYCHIATRY & NEUROLOGY

## 2023-11-27 PROCEDURE — 3074F SYST BP LT 130 MM HG: CPT | Mod: CPTII,S$GLB,, | Performed by: PSYCHIATRY & NEUROLOGY

## 2023-11-27 RX ORDER — DEUTETRABENAZINE 6 MG/1
TABLET, COATED ORAL
Qty: 120 TABLET | Refills: 5 | Status: SHIPPED | OUTPATIENT
Start: 2023-11-27

## 2023-11-27 NOTE — PROGRESS NOTES
Chief Complaint   Patient presents with    Tardive dystonia     States since he switched to [place] is working better. Movement to neck has slowed down. Stiffness and pain to neck at the end of the day gets worse.        This is a 53 y.o. male here for follow up for   Cervical dystonia.  Patient was started on a stated which he feels has helped with his symptoms.  Movement to his neck is better.  Stiffness does tend to get worse at the end of the day.    Medication List with Changes/Refills   Current Medications    ATORVASTATIN (LIPITOR) 80 MG TABLET    Take 80 mg by mouth once daily.    CLONAZEPAM (KLONOPIN) 1 MG TABLET    Take 0.5 mg by mouth 3 (three) times daily.    DULOXETINE (CYMBALTA) 30 MG CAPSULE    Take 30 mg by mouth 2 (two) times daily.    FINASTERIDE (PROSCAR) 5 MG TABLET    Take 5 mg by mouth once daily.    LISINOPRIL-HYDROCHLOROTHIAZIDE (PRINZIDE,ZESTORETIC) 20-25 MG TAB    Take 1 tablet by mouth once daily.    PAROXETINE (PAXIL) 40 MG TABLET    Take 30 mg by mouth once daily.    QUETIAPINE (SEROQUEL) 300 MG TAB    Take 300 mg by mouth every evening.    RISPERIDONE (RISPERDAL) 4 MG TABLET    Take 4 mg by mouth 2 (two) times daily.    SILDENAFIL (VIAGRA) 100 MG TABLET    Take 100 mg by mouth.    TAMSULOSIN (FLOMAX) 0.4 MG CAP    Take 1 capsule (0.4 mg total) by mouth once daily.    VALACYCLOVIR (VALTREX) 500 MG TABLET    Take 500 mg by mouth as needed.   Changed and/or Refilled Medications    Modified Medication Previous Medication    AUSTEDO 6 MG TAB AUSTEDO 6 mg Tab       Take 1 tab Q AM, 2 tabs Q pm for 1 wk, then 2 tabs PO BID thereafter    Take 1 tablet by mouth 2 (two) times a day.   Discontinued Medications    VALBENAZINE (INGREZZA) 80 MG CAP    Take 80 mg by mouth once daily.        Vitals:    11/27/23 0936   BP: 110/80   Pulse: 78        General: alert and oriented, no acute distress, no audible wheezes, pulse intact, no edema, min laterocollis to left, reduced ROM    Cognition and  Comprehension  Speech and language intact  Follows commands  Speech fluent  Attention intact  Memory for recent events intact from history taking  Affect pleasant  Fund of knowledge adequate    Cranial nerves  II. Optic: Visual fields full to confrontation both eyes  III, IV, VI. Oculomotor: Intact, Pupils equal, round and reactive to light, no nystagmus  V. Trigeminal: sensation to light touch normal  VII. No facial asymmetry or facial weakness  VIII. Hearing intact to spoken voice  IX/X. Glossopharyngeal/Vagus: Voice normal, palate rises symmetrically  XI. Axillary: Shoulder shrug normal  XII. Hypoglossal: Intact    Muscle Strength and Tone  Normal upper extremity tone  Normal lower extremity tone  Normal upper extremity strength  Normal lower extremity strength      Coordination and Gait  Finger to nose normal  Gait normal     1. Cervical dystonia  -     AUSTEDO 6 mg Tab; Take 1 tab Q AM, 2 tabs Q pm for 1 wk, then 2 tabs PO BID thereafter  Dispense: 120 tablet; Refill: 5    2. Drug-induced tardive dystonia  -     AUSTEDO 6 mg Tab; Take 1 tab Q AM, 2 tabs Q pm for 1 wk, then 2 tabs PO BID thereafter  Dispense: 120 tablet; Refill: 5     Increase austedo to 12 mg BID

## 2024-03-20 ENCOUNTER — LAB VISIT (OUTPATIENT)
Dept: LAB | Facility: HOSPITAL | Age: 54
End: 2024-03-20
Attending: UROLOGY
Payer: COMMERCIAL

## 2024-03-20 DIAGNOSIS — R97.20 RISING PSA LEVEL: ICD-10-CM

## 2024-03-20 LAB — PSA SERPL-MCNC: 0.56 NG/ML

## 2024-03-20 PROCEDURE — 84153 ASSAY OF PSA TOTAL: CPT

## 2024-03-20 PROCEDURE — 36415 COLL VENOUS BLD VENIPUNCTURE: CPT

## 2024-03-21 ENCOUNTER — OFFICE VISIT (OUTPATIENT)
Dept: UROLOGY | Facility: CLINIC | Age: 54
End: 2024-03-21
Payer: COMMERCIAL

## 2024-03-21 VITALS
HEART RATE: 79 BPM | HEIGHT: 68 IN | TEMPERATURE: 98 F | BODY MASS INDEX: 29.86 KG/M2 | WEIGHT: 197 LBS | SYSTOLIC BLOOD PRESSURE: 116 MMHG | OXYGEN SATURATION: 99 % | DIASTOLIC BLOOD PRESSURE: 77 MMHG

## 2024-03-21 DIAGNOSIS — R97.20 RISING PSA LEVEL: Primary | ICD-10-CM

## 2024-03-21 DIAGNOSIS — N13.8 BPH WITH OBSTRUCTION/LOWER URINARY TRACT SYMPTOMS: ICD-10-CM

## 2024-03-21 DIAGNOSIS — N52.8 OTHER MALE ERECTILE DYSFUNCTION: ICD-10-CM

## 2024-03-21 DIAGNOSIS — N40.1 BPH WITH OBSTRUCTION/LOWER URINARY TRACT SYMPTOMS: ICD-10-CM

## 2024-03-21 LAB
BILIRUB SERPL-MCNC: NEGATIVE MG/DL
BLOOD URINE, POC: NEGATIVE
COLOR, POC UA: YELLOW
GLUCOSE UR QL STRIP: NEGATIVE
KETONES UR QL STRIP: NEGATIVE
LEUKOCYTE ESTERASE URINE, POC: NEGATIVE
NITRITE, POC UA: NEGATIVE
PH, POC UA: 6.5
POC RESIDUAL URINE VOLUME: 0 ML (ref 0–100)
PROTEIN, POC: NEGATIVE
SPECIFIC GRAVITY, POC UA: 1.02
UROBILINOGEN, POC UA: 0.2

## 2024-03-21 PROCEDURE — 51798 US URINE CAPACITY MEASURE: CPT | Mod: PBBFAC | Performed by: UROLOGY

## 2024-03-21 PROCEDURE — 3074F SYST BP LT 130 MM HG: CPT | Mod: CPTII,,, | Performed by: UROLOGY

## 2024-03-21 PROCEDURE — 1159F MED LIST DOCD IN RCRD: CPT | Mod: CPTII,,, | Performed by: UROLOGY

## 2024-03-21 PROCEDURE — 3008F BODY MASS INDEX DOCD: CPT | Mod: CPTII,,, | Performed by: UROLOGY

## 2024-03-21 PROCEDURE — 4010F ACE/ARB THERAPY RXD/TAKEN: CPT | Mod: CPTII,,, | Performed by: UROLOGY

## 2024-03-21 PROCEDURE — 99214 OFFICE O/P EST MOD 30 MIN: CPT | Mod: PBBFAC,25 | Performed by: UROLOGY

## 2024-03-21 PROCEDURE — 81001 URINALYSIS AUTO W/SCOPE: CPT | Mod: PBBFAC | Performed by: UROLOGY

## 2024-03-21 PROCEDURE — 1160F RVW MEDS BY RX/DR IN RCRD: CPT | Mod: CPTII,,, | Performed by: UROLOGY

## 2024-03-21 PROCEDURE — 99214 OFFICE O/P EST MOD 30 MIN: CPT | Mod: S$PBB,,, | Performed by: UROLOGY

## 2024-03-21 PROCEDURE — 3078F DIAST BP <80 MM HG: CPT | Mod: CPTII,,, | Performed by: UROLOGY

## 2024-03-21 RX ORDER — TAMSULOSIN HYDROCHLORIDE 0.4 MG/1
1 CAPSULE ORAL DAILY
Qty: 90 CAPSULE | Refills: 3 | Status: SHIPPED | OUTPATIENT
Start: 2024-03-21

## 2024-03-21 NOTE — PROGRESS NOTES
Pt in Urology clinic today for 6 month f/u with PSA.  CC urine obtained and sent to lab for dipstick UA.  Evaluated per Dr. Adame.  Will RTC in 6 months with PSA.

## 2024-03-21 NOTE — PROGRESS NOTES
CC:  Rising PSA    HPI:  Larry Riggs is a 53 y.o. male seen for follow-up of rising PSA.   He has a history of urinary retention and was having to do self catheterization.  He has not catheterized since September of 2022.  He is urinating well and has no urinary complaints.  He is taking tamsulosin.  He was noted to have a rising PSA the last visit.  It went from 0.76 in 2021 and scott to 2.10 in 2023.  He did have repeat PSA for today's visit.  He informs me today that he sees Dr. Cabrera for erectile dysfunction.  He is on daily Cialis and is adding sildenafil as needed.    Bladder scan residual:  0 ml    Urinalysis:  Results for orders placed or performed in visit on 03/21/24   POCT URINE DIPSTICK WITH MICROSCOPE, AUTOMATED   Result Value Ref Range    Color, UA Yellow     Spec Grav UA 1.025     pH, UA 6.5     WBC, UA Negative     Nitrite, UA Negative     Protein, POC Negative     Glucose, UA Negative     Ketones, UA Negative     Urobilinogen, UA 0.2     Bilirubin, POC Negative     Blood, UA Negative      Microscopic Urinalysis:  WBC:   None per HPF     RBC:    None per HPF     Bacteria:    None per HPF     Squamous epithelial cells:    None per HPF      Crystals:   None    Lab Results:  PSA History:    11/09/21 00:04 06/13/23 11:19 09/19/23 15:38 03/20/24 09:16   0.76 2.10 1.60 0.56     ROS:  All systems reviewed and are negative except as documented in HPI and/or Assessment and Plan.     Patient Active Problem List:     Patient Active Problem List   Diagnosis    Encephalopathy    Leukopenia    Neutropenia    Tardive dystonia        Past Medical History:  Past Medical History:   Diagnosis Date    Anxiety disorder, unspecified     Cervical dystonia     Depression     GERD (gastroesophageal reflux disease)     Hypertension         Past Surgical History:  Past Surgical History:   Procedure Laterality Date    ANKLE SURGERY Right     KNEE SURGERY Left     neck fusion N/A         Family History:  Family History    Problem Relation Age of Onset    Hypertension Father     Hypertension Mother         Social History:  Social History     Socioeconomic History    Marital status:    Tobacco Use    Smoking status: Never    Smokeless tobacco: Never   Substance and Sexual Activity    Alcohol use: Not Currently    Drug use: Yes     Types: Marijuana     Comment: Medical        Allergies:  Review of patient's allergies indicates:  No Known Allergies     Objective:  Vitals:    03/21/24 1026   BP: 116/77   Pulse: 79   Temp: 97.8 °F (36.6 °C)     General:  Well developed, well nourished adult male in no acute distress  Abdomen: Soft, nontender, no masses  Extremities:  No clubbing, cyanosis, or edema  Neurologic:  Grossly intact  Musculoskeletal:  Normal tone    Last EDI:  September 2023    Assessment:  1. Rising PSA level  - POCT URINE DIPSTICK WITH MICROSCOPE, AUTOMATED  - POCT Bladder Scan  - PSA, Total (Diagnostic); Future    2. BPH with obstruction/lower urinary tract symptoms  - tamsulosin (FLOMAX) 0.4 mg Cap; Take 1 capsule (0.4 mg total) by mouth once daily.  Dispense: 90 capsule; Refill: 3    3. Other male erectile dysfunction     Plan:  PSA is now down to actually lower than baseline.  He is due for a EDI in September so we will repeat the PSA at that time and if it continues to be stable we will go to yearly.  Continue tamsulosin.  This was refilled today.  Will continue to follow with Dr. Cabrera concerning the erectile dysfunction.    Follow-up:  PSA in six months and follow-up after for EDI.

## 2024-04-05 DIAGNOSIS — G24.09 DRUG-INDUCED TARDIVE DYSTONIA: ICD-10-CM

## 2024-04-05 DIAGNOSIS — G24.3 CERVICAL DYSTONIA: Primary | ICD-10-CM

## 2024-04-05 DIAGNOSIS — T50.905A DRUG-INDUCED TARDIVE DYSTONIA: ICD-10-CM

## 2024-04-05 RX ORDER — DEUTETRABENAZINE 6 MG/1
TABLET, COATED ORAL
Qty: 120 TABLET | Refills: 5 | Status: CANCELLED | OUTPATIENT
Start: 2024-04-05

## 2024-04-08 RX ORDER — DEUTETRABENAZINE 12 MG/1
1 TABLET, COATED ORAL 2 TIMES DAILY
Qty: 60 TABLET | Refills: 5 | Status: SHIPPED | OUTPATIENT
Start: 2024-04-08

## 2024-04-25 ENCOUNTER — HOSPITAL ENCOUNTER (OUTPATIENT)
Dept: RADIOLOGY | Facility: CLINIC | Age: 54
Discharge: HOME | End: 2024-04-25
Attending: ORTHOPAEDIC SURGERY
Payer: COMMERCIAL

## 2024-04-25 ENCOUNTER — OFFICE VISIT (OUTPATIENT)
Dept: ORTHOPEDICS | Facility: CLINIC | Age: 54
End: 2024-04-25
Payer: COMMERCIAL

## 2024-04-25 VITALS
HEART RATE: 71 BPM | SYSTOLIC BLOOD PRESSURE: 117 MMHG | WEIGHT: 196 LBS | BODY MASS INDEX: 29.7 KG/M2 | DIASTOLIC BLOOD PRESSURE: 80 MMHG | HEIGHT: 68 IN

## 2024-04-25 DIAGNOSIS — Z98.890 HISTORY OF CERVICAL SPINAL SURGERY: Primary | ICD-10-CM

## 2024-04-25 DIAGNOSIS — M54.2 CERVICAL PAIN: ICD-10-CM

## 2024-04-25 DIAGNOSIS — M54.12 LEFT CERVICAL RADICULOPATHY: ICD-10-CM

## 2024-04-25 DIAGNOSIS — M25.512 LEFT SHOULDER PAIN, UNSPECIFIED CHRONICITY: ICD-10-CM

## 2024-04-25 PROCEDURE — 73030 X-RAY EXAM OF SHOULDER: CPT | Mod: LT,,, | Performed by: ORTHOPAEDIC SURGERY

## 2024-04-25 PROCEDURE — 3008F BODY MASS INDEX DOCD: CPT | Mod: CPTII,,, | Performed by: ORTHOPAEDIC SURGERY

## 2024-04-25 PROCEDURE — 4010F ACE/ARB THERAPY RXD/TAKEN: CPT | Mod: CPTII,,, | Performed by: ORTHOPAEDIC SURGERY

## 2024-04-25 PROCEDURE — 3074F SYST BP LT 130 MM HG: CPT | Mod: CPTII,,, | Performed by: ORTHOPAEDIC SURGERY

## 2024-04-25 PROCEDURE — 1159F MED LIST DOCD IN RCRD: CPT | Mod: CPTII,,, | Performed by: ORTHOPAEDIC SURGERY

## 2024-04-25 PROCEDURE — 3079F DIAST BP 80-89 MM HG: CPT | Mod: CPTII,,, | Performed by: ORTHOPAEDIC SURGERY

## 2024-04-25 PROCEDURE — 72050 X-RAY EXAM NECK SPINE 4/5VWS: CPT | Mod: ,,, | Performed by: ORTHOPAEDIC SURGERY

## 2024-04-25 PROCEDURE — 99204 OFFICE O/P NEW MOD 45 MIN: CPT | Mod: ,,, | Performed by: ORTHOPAEDIC SURGERY

## 2024-04-25 RX ORDER — CLONAZEPAM 0.5 MG/1
1 TABLET ORAL 3 TIMES DAILY PRN
COMMUNITY
Start: 2023-05-10

## 2024-04-25 RX ORDER — DOXEPIN HYDROCHLORIDE 10 MG/1
10 CAPSULE ORAL
COMMUNITY
Start: 2023-06-21

## 2024-04-25 RX ORDER — TIZANIDINE 4 MG/1
4 TABLET ORAL EVERY 8 HOURS PRN
Qty: 30 TABLET | Refills: 1 | Status: SHIPPED | OUTPATIENT
Start: 2024-04-25

## 2024-04-25 RX ORDER — GABAPENTIN 600 MG/1
600 TABLET ORAL NIGHTLY
Qty: 30 TABLET | Refills: 1 | Status: SHIPPED | OUTPATIENT
Start: 2024-04-25

## 2024-04-25 RX ORDER — TADALAFIL 5 MG/1
5 TABLET ORAL
COMMUNITY

## 2024-04-25 RX ORDER — ASPIRIN 81 MG/1
1 TABLET ORAL
COMMUNITY
Start: 2023-10-17

## 2024-04-25 RX ORDER — BUSPIRONE HYDROCHLORIDE 15 MG/1
30 TABLET ORAL
COMMUNITY
Start: 2023-06-21

## 2024-04-25 RX ORDER — PAROXETINE 30 MG/1
60 TABLET, FILM COATED ORAL
COMMUNITY
Start: 2023-06-21

## 2024-04-25 NOTE — PROGRESS NOTES
[facility]  Orthopedic Clinic Note      Chief Complaint:   Chief Complaint   Patient presents with    Shoulder Pain     Left shoulder pain. No prior sx. No injury. Xr today. States pain has been ongoing for about 2 months. Pain global to shoulder. Has history of cervical dystonia.  He reports pain in neck radiating into shoulder. Has tried botox injections which caused anxiety. Stiffness to shoulder gets worse by end of day.  Pain is global to shoulder. He has tried MARY with Dr. Stanley which was not helpful as well as Botox injections with Dr. Nina 3 separate occasions.  He does not remember this being helpful.     Referring Physician: No ref. provider found      History of Present Illness:    This is a 53 y.o. year old male presenting with left shoulder pain.  He has a history of a previous cervical surgery approximately 10+ years ago.  He states his pain in the left shoulder started about 2 months ago.  The pain is in the axilla and also on the top of the shoulder.  He can move his arm normally.  He also has had Botox injections for cervical dystonia.  He has also had epidural injections.        Past Medical History:   Diagnosis Date    Anxiety disorder, unspecified     Cervical dystonia     Depression     GERD (gastroesophageal reflux disease)     Hypertension        Past Surgical History:   Procedure Laterality Date    ANKLE SURGERY Right     KNEE SURGERY Left     neck fusion N/A        Current Outpatient Medications   Medication Sig Dispense Refill    aspirin (ECOTRIN) 81 MG EC tablet Take 1 tablet by mouth.      atorvastatin (LIPITOR) 80 MG tablet Take 80 mg by mouth once daily.      AUSTEDO 12 mg Tab TAKE 1 TABLET BY MOUTH TWICE DAILY 60 tablet 5    AUSTEDO 6 mg Tab Take 1 tab Q AM, 2 tabs Q pm for 1 wk, then 2 tabs PO BID thereafter 120 tablet 5    busPIRone (BUSPAR) 15 MG tablet Take 30 mg by mouth.      clonazePAM (KLONOPIN) 0.5 MG tablet Take 1 tablet by mouth 3 (three) times daily as  "needed.      doxepin (SINEQUAN) 10 MG capsule Take 10 mg by mouth.      DULoxetine (CYMBALTA) 30 MG capsule Take 30 mg by mouth 2 (two) times daily.      finasteride (PROSCAR) 5 mg tablet Take 5 mg by mouth once daily.      lisinopriL-hydrochlorothiazide (PRINZIDE,ZESTORETIC) 20-25 mg Tab Take 1 tablet by mouth once daily.      paroxetine (PAXIL) 30 MG tablet Take 60 mg by mouth.      QUEtiapine (SEROQUEL) 300 MG Tab Take 300 mg by mouth every evening.      risperiDONE (RISPERDAL) 4 MG tablet Take 4 mg by mouth 2 (two) times daily.      sildenafiL (VIAGRA) 100 MG tablet Take 100 mg by mouth.      tadalafiL (CIALIS) 5 MG tablet Take 5 mg by mouth.      tamsulosin (FLOMAX) 0.4 mg Cap Take 1 capsule (0.4 mg total) by mouth once daily. 90 capsule 3    valACYclovir (VALTREX) 500 MG tablet Take 500 mg by mouth as needed.      clonazePAM (KLONOPIN) 1 MG tablet Take 0.5 mg by mouth 3 (three) times daily. (Patient not taking: Reported on 4/25/2024)      gabapentin (NEURONTIN) 600 MG tablet Take 1 tablet (600 mg total) by mouth nightly. 30 tablet 1    tiZANidine (ZANAFLEX) 4 MG tablet Take 1 tablet (4 mg total) by mouth every 8 (eight) hours as needed (muscle spasms). 30 tablet 1     No current facility-administered medications for this visit.       Review of patient's allergies indicates:  No Known Allergies    Family History   Problem Relation Name Age of Onset    Hypertension Father      Hypertension Mother         Social History     Socioeconomic History    Marital status:    Tobacco Use    Smoking status: Never    Smokeless tobacco: Never   Substance and Sexual Activity    Alcohol use: Not Currently    Drug use: Yes     Types: Marijuana     Comment: Medical           Review of Systems:  All review of systems negative except for those stated in the HPI.    Examination:    Vital Signs:    Vitals:    04/25/24 0934   BP: 117/80   Pulse: 71   Weight: 88.9 kg (196 lb)   Height: 5' 8" (1.727 m)       Body mass index is " 29.8 kg/m².    Physical Examination:  General: Well-developed, well-nourished.  Neuro: Alert and oriented x 3.  Psych: Normal mood and affect.  Card: Regular rate and rhythm.  Resp: Unlabored and quiet.  Shoulder Exam:  No obvious deformity. No medial or lateral scapula winging. Forward flexion to 170 degrees and abduction to 170 degrees and external rotation 80 degrees is and internal rotation is 80 degrees. Negative empty can, Whipple, Drop Arm Test, Serna, impingement, AC joint tenderness. Negative biceps groove tenderness, Douglas´s, Yergason´s, Speed test. Negative Apprehension and Relocation test. 5/5 strength, normal skin appearance and palpable pulses distally. Sensibility normal.     Imaging: X-rays ordered and images interpreted today personally by me of four views of left shoulder shows demonstrates no acute osseous pathology and four views of cervical spine shows significant degenerative changes in the previous surgery.    Assessment: History of cervical spinal surgery  -     X-Ray Cervical Spine Complete 5 view; Future; Expected date: 04/25/2024  -     tiZANidine (ZANAFLEX) 4 MG tablet; Take 1 tablet (4 mg total) by mouth every 8 (eight) hours as needed (muscle spasms).  Dispense: 30 tablet; Refill: 1  -     gabapentin (NEURONTIN) 600 MG tablet; Take 1 tablet (600 mg total) by mouth nightly.  Dispense: 30 tablet; Refill: 1    Left cervical radiculopathy  -     X-Ray Shoulder 2 or More Views Left; Future; Expected date: 04/25/2024  -     tiZANidine (ZANAFLEX) 4 MG tablet; Take 1 tablet (4 mg total) by mouth every 8 (eight) hours as needed (muscle spasms).  Dispense: 30 tablet; Refill: 1  -     gabapentin (NEURONTIN) 600 MG tablet; Take 1 tablet (600 mg total) by mouth nightly.  Dispense: 30 tablet; Refill: 1        Plan:  I believe this patient has shoulder pain secondary to his previous cervical fusion.  I recommend that he goes back and sees Dr Chand to re-evaluate his cervical spine. I will send  him some medicine to help with his pain until he can see Dr Chand. Patient will return as needed. Patient verbalized understanding of the plan of care and had no further questions.          Rajeev Lepe MD personally performed the services described in this documentation, including but not limited to patient's history, physical examination, and assessment and plan of care. All medical record entries made by Johnna Batres ATC, OTC were performed at his direction and in his presence. The medical record was reviewed and is accurate and complete.         No follow-ups on file.      DISCLAIMER: This note may have been dictated using voice recognition software and may contain grammatical errors.     NOTE: Consult report sent to referring provider via [employer].

## 2024-05-23 ENCOUNTER — HOSPITAL ENCOUNTER (EMERGENCY)
Facility: HOSPITAL | Age: 54
Discharge: HOME | End: 2024-05-23
Attending: FAMILY MEDICINE
Payer: COMMERCIAL

## 2024-05-23 VITALS
WEIGHT: 200 LBS | SYSTOLIC BLOOD PRESSURE: 131 MMHG | BODY MASS INDEX: 30.31 KG/M2 | DIASTOLIC BLOOD PRESSURE: 83 MMHG | HEIGHT: 68 IN | RESPIRATION RATE: 16 BRPM | TEMPERATURE: 99 F | OXYGEN SATURATION: 99 % | HEART RATE: 63 BPM

## 2024-05-23 DIAGNOSIS — R07.89 ATYPICAL CHEST PAIN: Primary | ICD-10-CM

## 2024-05-23 DIAGNOSIS — R07.9 CHEST PAIN: ICD-10-CM

## 2024-05-23 LAB
ABS NEUT CALC (OHS): 0.81 X10(3)/MCL (ref 2.1–9.2)
ALBUMIN SERPL-MCNC: 3.7 G/DL (ref 3.5–5)
ALBUMIN/GLOB SERPL: 1.1 RATIO (ref 1.1–2)
ALP SERPL-CCNC: 104 UNIT/L (ref 40–150)
ALT SERPL-CCNC: 100 UNIT/L (ref 0–55)
ANION GAP SERPL CALC-SCNC: 6 MEQ/L
AST SERPL-CCNC: 81 UNIT/L (ref 5–34)
BILIRUB SERPL-MCNC: 0.4 MG/DL
BNP BLD-MCNC: <10 PG/ML
BUN SERPL-MCNC: 14.7 MG/DL (ref 8.4–25.7)
CALCIUM SERPL-MCNC: 8.8 MG/DL (ref 8.4–10.2)
CHLORIDE SERPL-SCNC: 104 MMOL/L (ref 98–107)
CO2 SERPL-SCNC: 30 MMOL/L (ref 22–29)
CREAT SERPL-MCNC: 1.49 MG/DL (ref 0.73–1.18)
CREAT/UREA NIT SERPL: 10
EOSINOPHIL NFR BLD MANUAL: 0.05 X10(3)/MCL (ref 0–0.9)
EOSINOPHIL NFR BLD MANUAL: 2 % (ref 0–8)
ERYTHROCYTE [DISTWIDTH] IN BLOOD BY AUTOMATED COUNT: 14.1 % (ref 11.5–17)
GFR SERPLBLD CREATININE-BSD FMLA CKD-EPI: 55 ML/MIN/1.73/M2
GLOBULIN SER-MCNC: 3.4 GM/DL (ref 2.4–3.5)
GLUCOSE SERPL-MCNC: 115 MG/DL (ref 74–100)
HCT VFR BLD AUTO: 39.4 % (ref 42–52)
HGB BLD-MCNC: 13 G/DL (ref 14–18)
HOLD SPECIMEN: NORMAL
HOLD SPECIMEN: NORMAL
LYMPH ABN # BLD MANUAL: 4 %
LYMPHOCYTES NFR BLD MANUAL: 1.46 X10(3)/MCL
LYMPHOCYTES NFR BLD MANUAL: 54 % (ref 13–40)
MAGNESIUM SERPL-MCNC: 1.7 MG/DL (ref 1.6–2.6)
MCH RBC QN AUTO: 28.6 PG (ref 27–31)
MCHC RBC AUTO-ENTMCNC: 33 G/DL (ref 33–36)
MCV RBC AUTO: 86.6 FL (ref 80–94)
MONOCYTES NFR BLD MANUAL: 0.27 X10(3)/MCL (ref 0.1–1.3)
MONOCYTES NFR BLD MANUAL: 10 % (ref 2–11)
NEUTROPHILS NFR BLD MANUAL: 30 % (ref 47–80)
NRBC BLD AUTO-RTO: 0 %
PLATELET # BLD AUTO: 154 X10(3)/MCL (ref 130–400)
PLATELET # BLD EST: NORMAL 10*3/UL
PMV BLD AUTO: 11.3 FL (ref 7.4–10.4)
POTASSIUM SERPL-SCNC: 3.3 MMOL/L (ref 3.5–5.1)
PROT SERPL-MCNC: 7.1 GM/DL (ref 6.4–8.3)
RBC # BLD AUTO: 4.55 X10(6)/MCL (ref 4.7–6.1)
RBC MORPH BLD: NORMAL
SODIUM SERPL-SCNC: 140 MMOL/L (ref 136–145)
TROPONIN I SERPL-MCNC: <0.01 NG/ML (ref 0–0.04)
TROPONIN I SERPL-MCNC: <0.01 NG/ML (ref 0–0.04)
WBC # SPEC AUTO: 2.71 X10(3)/MCL (ref 4.5–11.5)

## 2024-05-23 PROCEDURE — 99285 EMERGENCY DEPT VISIT HI MDM: CPT | Mod: 25

## 2024-05-23 PROCEDURE — 25000003 PHARM REV CODE 250: Performed by: FAMILY MEDICINE

## 2024-05-23 PROCEDURE — 80053 COMPREHEN METABOLIC PANEL: CPT | Performed by: FAMILY MEDICINE

## 2024-05-23 PROCEDURE — 85027 COMPLETE CBC AUTOMATED: CPT | Performed by: FAMILY MEDICINE

## 2024-05-23 PROCEDURE — 83735 ASSAY OF MAGNESIUM: CPT | Performed by: FAMILY MEDICINE

## 2024-05-23 PROCEDURE — 84484 ASSAY OF TROPONIN QUANT: CPT | Mod: 91 | Performed by: FAMILY MEDICINE

## 2024-05-23 PROCEDURE — 83880 ASSAY OF NATRIURETIC PEPTIDE: CPT | Performed by: FAMILY MEDICINE

## 2024-05-23 PROCEDURE — 93005 ELECTROCARDIOGRAM TRACING: CPT

## 2024-05-23 RX ADMIN — POTASSIUM BICARBONATE 40 MEQ: 391 TABLET, EFFERVESCENT ORAL at 10:05

## 2024-05-23 NOTE — Clinical Note
"Larry Wise" Keely was seen and treated in our emergency department on 5/23/2024.  He may return to work on 05/25/2024.       If you have any questions or concerns, please don't hesitate to call.      Tayo MACHUCA    "

## 2024-05-24 LAB
HOLD SPECIMEN: NORMAL
HOLD SPECIMEN: NORMAL
OHS QRS DURATION: 66 MS
OHS QTC CALCULATION: 274 MS

## 2024-05-24 NOTE — ED PROVIDER NOTES
Encounter Date: 5/23/2024       History     Chief Complaint   Patient presents with    Chest Pain     Pt. C/o midsternal chest pain that is worsened with movement. Also endorses L arm pain for the last 2 months that he describes as cramping. EKG obtained     Patient was a 53-year-old gentleman history hypertension presents emergency room with complaints of spasms in his left upper arm that has been ongoing for the last few months, but tonight 30 minutes prior to arrival, he turned over in the bed, began having palpitations and substernal chest pain.  No associated nausea or vomiting diaphoresis or shortness of breath.  When symptoms persisted, he decided come the emergency room for evaluation.  Currently reports the pain has improved, currently 5/10.    The history is provided by the patient.     Review of patient's allergies indicates:  No Known Allergies  Past Medical History:   Diagnosis Date    Anxiety disorder, unspecified     Cervical dystonia     Depression     GERD (gastroesophageal reflux disease)     Hypertension      Past Surgical History:   Procedure Laterality Date    ANKLE SURGERY Right     KNEE SURGERY Left     neck fusion N/A      Family History   Problem Relation Name Age of Onset    Hypertension Father      Hypertension Mother       Social History     Tobacco Use    Smoking status: Never    Smokeless tobacco: Never   Substance Use Topics    Alcohol use: Not Currently    Drug use: Yes     Types: Marijuana     Comment: Medical     Review of Systems   Constitutional:  Negative for chills, fatigue and fever.   HENT:  Negative for ear pain, rhinorrhea and sore throat.    Eyes:  Negative for photophobia and pain.   Respiratory:  Negative for cough, shortness of breath and wheezing.    Cardiovascular:  Positive for chest pain.   Gastrointestinal:  Negative for abdominal pain, diarrhea, nausea and vomiting.   Genitourinary:  Negative for dysuria.   Neurological:  Negative for dizziness, weakness and  headaches.   All other systems reviewed and are negative.      Physical Exam     Initial Vitals [05/23/24 2125]   BP Pulse Resp Temp SpO2   136/80 84 18 -- 98 %      MAP       --         Physical Exam    Nursing note and vitals reviewed.  Constitutional: He appears well-developed and well-nourished.   HENT:   Head: Normocephalic and atraumatic.   Mouth/Throat: Oropharynx is clear and moist.   Eyes: EOM are normal. Pupils are equal, round, and reactive to light.   Neck: Neck supple.   Normal range of motion.  Cardiovascular:  Normal rate, regular rhythm, normal heart sounds and intact distal pulses.     Exam reveals no gallop and no friction rub.       No murmur heard.  Pulmonary/Chest: Breath sounds normal. No respiratory distress. He has no wheezes. He has no rhonchi. He has no rales.   Abdominal: Abdomen is soft. Bowel sounds are normal. He exhibits no distension. There is no abdominal tenderness.   Musculoskeletal:         General: Normal range of motion.      Cervical back: Normal range of motion and neck supple.     Neurological: He is alert and oriented to person, place, and time. He has normal strength.   Skin: Skin is warm and dry.   Psychiatric: He has a normal mood and affect. His behavior is normal. Judgment and thought content normal.         ED Course   Procedures  Labs Reviewed   COMPREHENSIVE METABOLIC PANEL - Abnormal; Notable for the following components:       Result Value    Potassium 3.3 (*)     CO2 30 (*)     Glucose 115 (*)     Creatinine 1.49 (*)      (*)     AST 81 (*)     All other components within normal limits   CBC WITH DIFFERENTIAL - Abnormal; Notable for the following components:    WBC 2.71 (*)     RBC 4.55 (*)     Hgb 13.0 (*)     Hct 39.4 (*)     MPV 11.3 (*)     All other components within normal limits   MANUAL DIFFERENTIAL - Abnormal; Notable for the following components:    Neutrophils % 30 (*)     Lymphs % 54 (*)     Neutrophils Abs Calc 0.813 (*)     All other  components within normal limits   TROPONIN I - Normal   B-TYPE NATRIURETIC PEPTIDE - Normal   MAGNESIUM - Normal   TROPONIN I - Normal   CBC W/ AUTO DIFFERENTIAL    Narrative:     The following orders were created for panel order CBC auto differential.  Procedure                               Abnormality         Status                     ---------                               -----------         ------                     CBC with Differential[[id number]]       Abnormal            Final result               Manual Differential[[id number]]         Abnormal            Final result                 Please view results for these tests on the individual orders.   EXTRA TUBES    Narrative:     The following orders were created for panel order EXTRA TUBES.  Procedure                               Abnormality         Status                     ---------                               -----------         ------                     Light Blue Top Hold[[id number]]                             Final result               Red Top Hold[[id number]]                                    Final result                 Please view results for these tests on the individual orders.   LIGHT BLUE TOP HOLD   RED TOP HOLD   EXTRA TUBES    Narrative:     The following orders were created for panel order EXTRA TUBES.  Procedure                               Abnormality         Status                     ---------                               -----------         ------                     Light Blue Top Hold[[id number]]                             In process                 Lavender Top Hold[[id number]]                               In process                   Please view results for these tests on the individual orders.   LIGHT BLUE TOP HOLD   LAVENDER TOP HOLD     EKG Readings: (Independently Interpreted)   My Independent EKG Interpretation  05/23/2024 9:21 PM  Rate: 80 bpm  Rhythm: Sinus  Axis: Normal  Intervals: Normal  ST Changes:  Nonspecific  Impression: Normal sinus rhythm with nonspecific ST changes    Comparison: 05/20/22 1706 - no acute changes       Imaging Results              X-Ray Chest AP Portable (Final result)  Result time 05/23/24 22:00:26      Final result by Homer Simeon MD (05/23/24 22:00:26)                   Impression:      No acute abnormality.      Electronically signed by: Homer Simeon MD  Date:    05/23/2024  Time:    22:00               Narrative:    EXAMINATION:  XR CHEST AP PORTABLE    CLINICAL HISTORY:  Chest Pain;    TECHNIQUE:  Single frontal view of the chest was performed.    COMPARISON:  05/20/2022    FINDINGS:  The lungs are clear, with normal appearance of pulmonary vasculature and no pleural effusion or pneumothorax.    The cardiac silhouette is normal in size. The hilar and mediastinal contours are unremarkable.    Bones are intact.                        Wet Read by Eamon Grimaldo MD (05/23/24 21:52:55, [facility], Emergency Medicine)    No acute abnormality appreciated.                                     Medications   potassium bicarbonate disintegrating tablet 40 mEq (40 mEq Oral Given 5/23/24 2248)     Medical Decision Making  53-year-old gentleman history of hypertension presenting with complaints of chest pain.  Will obtain cardiac workup and continue to monitor.    Differential diagnosis:  Atypical chest pain, electrolyte abnormality, NSTEMI    Amount and/or Complexity of Data Reviewed  External Data Reviewed: notes.     Details: Reviewed nephrology clinic note from 05/20/2024, patient evaluated for chronic kidney disease, stage 3a.  Labs: ordered. Decision-making details documented in ED Course.  Radiology: ordered and independent interpretation performed.    Risk  Prescription drug management.               ED Course as of 05/23/24 2342   Thu May 23, 2024   2220 Magnesium : 1.70 [MW]   2220 BNP: <10.0 [MW]   2220 WBC(!): 2.71 [MW]   2220 Hemoglobin(!): 13.0 [MW]    2220 Hematocrit(!): 39.4 [MW]   2220 Platelet Count: 154 [MW]   2220 Sodium: 140 [MW]   2220 Potassium(!): 3.3 [MW]   2220 Chloride: 104 [MW]   2220 CO2(!): 30 [MW]   2220 Glucose(!): 115 [MW]   2220 BUN: 14.7 [MW]   2220 Creatinine(!): 1.49 [MW]   2220 ALP: 104 [MW]   2220 ALT(!): 100 [MW]   2220 AST(!): 81 [MW]   2220 eGFR: 55 [MW]   2253 A laboratory evaluation, initial troponins less than 0.010.  Patient feeling improved.  Noted to be slightly hypokalemic with a potassium of 3.3.  Will order a 2nd troponin since patient presented 30 minutes after symptoms began.  Will continue to monitor. [MW]   2341 Troponin I: <0.010  Repeat troponin negative.  Reassurance given to the patient.  ER precautions given for any acute worsening. [MW]      ED Course User Index  [MW] Eamon Grimaldo MD                           Clinical Impression:  Final diagnoses:  [R07.9] Chest pain  [R07.89] Atypical chest pain (Primary)          ED Disposition Condition    Discharge Stable          ED Prescriptions    None       Follow-up Information       Follow up With Specialties Details Why Contact Info    Seun Murillo MD Family Medicine   [address] [facility] [address]  [phone]      [facility] Emergency Medicine  As needed, If symptoms worsen [address] [facility] [address] [phone]             Eamon Grimaldo MD  05/23/24 1943

## 2024-09-16 ENCOUNTER — LAB VISIT (OUTPATIENT)
Dept: LAB | Facility: HOSPITAL | Age: 54
End: 2024-09-16
Attending: UROLOGY
Payer: COMMERCIAL

## 2024-09-16 DIAGNOSIS — R97.20 RISING PSA LEVEL: ICD-10-CM

## 2024-09-16 LAB — PSA SERPL-MCNC: 1.53 NG/ML

## 2024-09-16 PROCEDURE — 84153 ASSAY OF PSA TOTAL: CPT

## 2024-09-16 PROCEDURE — 36415 COLL VENOUS BLD VENIPUNCTURE: CPT

## 2024-09-23 ENCOUNTER — OFFICE VISIT (OUTPATIENT)
Dept: UROLOGY | Facility: CLINIC | Age: 54
End: 2024-09-23
Payer: COMMERCIAL

## 2024-09-23 VITALS
WEIGHT: 200 LBS | OXYGEN SATURATION: 98 % | TEMPERATURE: 98 F | HEART RATE: 75 BPM | SYSTOLIC BLOOD PRESSURE: 118 MMHG | HEIGHT: 68 IN | DIASTOLIC BLOOD PRESSURE: 76 MMHG | BODY MASS INDEX: 30.31 KG/M2

## 2024-09-23 DIAGNOSIS — E29.1 HYPOGONADISM IN MALE: ICD-10-CM

## 2024-09-23 DIAGNOSIS — R97.20 RISING PSA LEVEL: Primary | ICD-10-CM

## 2024-09-23 DIAGNOSIS — N52.8 OTHER MALE ERECTILE DYSFUNCTION: ICD-10-CM

## 2024-09-23 DIAGNOSIS — N40.1 BPH WITH OBSTRUCTION/LOWER URINARY TRACT SYMPTOMS: ICD-10-CM

## 2024-09-23 DIAGNOSIS — N13.8 BPH WITH OBSTRUCTION/LOWER URINARY TRACT SYMPTOMS: ICD-10-CM

## 2024-09-23 LAB
BILIRUB SERPL-MCNC: NEGATIVE MG/DL
BLOOD URINE, POC: NEGATIVE
COLOR, POC UA: YELLOW
GLUCOSE UR QL STRIP: NEGATIVE
KETONES UR QL STRIP: NEGATIVE
LEUKOCYTE ESTERASE URINE, POC: NEGATIVE
NITRITE, POC UA: NEGATIVE
PH, POC UA: 6
POC RESIDUAL URINE VOLUME: 1 ML (ref 0–100)
PROTEIN, POC: NEGATIVE
SPECIFIC GRAVITY, POC UA: 1.02
UROBILINOGEN, POC UA: 0.2

## 2024-09-23 PROCEDURE — 1159F MED LIST DOCD IN RCRD: CPT | Mod: CPTII,,, | Performed by: UROLOGY

## 2024-09-23 PROCEDURE — 3074F SYST BP LT 130 MM HG: CPT | Mod: CPTII,,, | Performed by: UROLOGY

## 2024-09-23 PROCEDURE — 99215 OFFICE O/P EST HI 40 MIN: CPT | Mod: PBBFAC | Performed by: UROLOGY

## 2024-09-23 PROCEDURE — 81001 URINALYSIS AUTO W/SCOPE: CPT | Mod: PBBFAC | Performed by: UROLOGY

## 2024-09-23 PROCEDURE — 99214 OFFICE O/P EST MOD 30 MIN: CPT | Mod: S$PBB,,, | Performed by: UROLOGY

## 2024-09-23 PROCEDURE — 51798 US URINE CAPACITY MEASURE: CPT | Mod: PBBFAC | Performed by: UROLOGY

## 2024-09-23 PROCEDURE — 3078F DIAST BP <80 MM HG: CPT | Mod: CPTII,,, | Performed by: UROLOGY

## 2024-09-23 PROCEDURE — 1160F RVW MEDS BY RX/DR IN RCRD: CPT | Mod: CPTII,,, | Performed by: UROLOGY

## 2024-09-23 PROCEDURE — 3008F BODY MASS INDEX DOCD: CPT | Mod: CPTII,,, | Performed by: UROLOGY

## 2024-09-23 PROCEDURE — 4010F ACE/ARB THERAPY RXD/TAKEN: CPT | Mod: CPTII,,, | Performed by: UROLOGY

## 2024-09-23 RX ORDER — TESTOSTERONE CYPIONATE 200 MG/ML
INJECTION, SOLUTION INTRAMUSCULAR
COMMUNITY
Start: 2024-07-11

## 2024-09-23 NOTE — PROGRESS NOTES
CC:  Six month    HPI:  Larry Riggs is a 53 y.o. male seen for six month follow-up.  He has a history of urinary retention and was having to do self catheterization.  He has not catheterized since September of 2022.  He is urinating well and has no urinary complaints.  He is taking tamsulosin.  He was noted to have a rising PSA the last visit.  It went from 0.76 in 2021 and scott to 2.10 in 2023.  He did have repeat PSA for today's visit.  He informs me today that he sees Dr. Cabrera for erectile dysfunction.  He is on daily Cialis and is adding sildenafil as needed.  He is also on testosterone injections started since I last saw him provided by Dr. Cabrera for hypogonadism.       Urinalysis:  Results for orders placed or performed in visit on 09/23/24   POCT URINE DIPSTICK WITH MICROSCOPE, AUTOMATED   Result Value Ref Range    Color, UA Yellow     Spec Grav UA 1.025     pH, UA 6.0     WBC, UA Negative     Nitrite, UA Negative     Protein, POC Negative     Glucose, UA Negative     Ketones, UA Negative     Urobilinogen, UA 0.2     Bilirubin, POC Negative     Blood, UA Negative      Microscopic Urinalysis:  WBC:   None per HPF     RBC:    None per HPF     Bacteria:    None per HPF     Squamous epithelial cells:  None per HPF      Crystals:   None    Lab Results:  PSA History:    11/09/21 00:04 06/13/23 11:19 09/19/23 15:38 03/20/24 09:16 09/16/24 10:06   0.76 2.10 1.60 0.56 1.53       ROS:  All systems reviewed and are negative except as documented in HPI and/or Assessment and Plan.     Patient Active Problem List:     Patient Active Problem List   Diagnosis    Encephalopathy    Leukopenia    Neutropenia    Tardive dystonia        Past Medical History:  Past Medical History:   Diagnosis Date    Anxiety disorder, unspecified     Cervical dystonia     Depression     GERD (gastroesophageal reflux disease)     Hypertension         Past Surgical History:  Past Surgical History:   Procedure Laterality Date    ANKLE SURGERY  Right     KNEE SURGERY Left     neck fusion N/A         Family History:  Family History   Problem Relation Name Age of Onset    Hypertension Father      Hypertension Mother          Social History:  Social History     Socioeconomic History    Marital status:    Tobacco Use    Smoking status: Never    Smokeless tobacco: Never   Substance and Sexual Activity    Alcohol use: Not Currently    Drug use: Yes     Types: Marijuana     Comment: Medical        Allergies:  Review of patient's allergies indicates:  No Known Allergies     Objective:  Vitals:    09/23/24 1003   BP: 118/76   Pulse: 75   Temp: 98.4 °F (36.9 °C)     General:  Well developed, well nourished adult male in no acute distress  Abdomen: Soft, nontender, no masses  Extremities:  No clubbing, cyanosis, or edema  Neurologic:  Grossly intact  Musculoskeletal:  Normal tone  Penis:  Circumcised, no lesions  Scrotum:  No hydrocele  Testicles:  Nontender, no masses  Epididymis:  Normal without masses  Prostate exam:  Nontender, symmetrical, no nodules  Rectal:  Normal rectal tone    Assessment:  1. Rising PSA level    2. BPH with obstruction/lower urinary tract symptoms  - POCT URINE DIPSTICK WITH MICROSCOPE, AUTOMATED  - POCT Bladder Scan    3. Other male erectile dysfunction    4. Hypogonadism in male     Plan:  PSA did go up from the previous value but is stable with his prior results.  Continue tamsulosin.  Continue the Tadalafil and sildenafil regimen.  He will continue testosterone injections with Dr. Cabrera.    Follow-up:  PSA in one year and follow-up after for EDI.

## 2025-02-12 DIAGNOSIS — N13.8 BPH WITH OBSTRUCTION/LOWER URINARY TRACT SYMPTOMS: ICD-10-CM

## 2025-02-12 DIAGNOSIS — N40.1 BPH WITH OBSTRUCTION/LOWER URINARY TRACT SYMPTOMS: ICD-10-CM

## 2025-02-12 RX ORDER — TAMSULOSIN HYDROCHLORIDE 0.4 MG/1
1 CAPSULE ORAL
Qty: 90 CAPSULE | Refills: 2 | Status: SHIPPED | OUTPATIENT
Start: 2025-02-12

## 2025-04-12 ENCOUNTER — HOSPITAL ENCOUNTER (EMERGENCY)
Facility: HOSPITAL | Age: 55
Discharge: HOME | End: 2025-04-12
Attending: EMERGENCY MEDICINE
Payer: COMMERCIAL

## 2025-04-12 VITALS
SYSTOLIC BLOOD PRESSURE: 123 MMHG | HEIGHT: 68 IN | WEIGHT: 201.81 LBS | RESPIRATION RATE: 20 BRPM | OXYGEN SATURATION: 100 % | BODY MASS INDEX: 30.59 KG/M2 | HEART RATE: 76 BPM | DIASTOLIC BLOOD PRESSURE: 74 MMHG | TEMPERATURE: 98 F

## 2025-04-12 DIAGNOSIS — Z11.52 ENCOUNTER FOR SCREENING FOR COVID-19: Primary | ICD-10-CM

## 2025-04-12 LAB — SARS-COV-2 RDRP RESP QL NAA+PROBE: NEGATIVE

## 2025-04-12 PROCEDURE — U0002 COVID-19 LAB TEST NON-CDC: HCPCS | Performed by: EMERGENCY MEDICINE

## 2025-04-12 PROCEDURE — 99282 EMERGENCY DEPT VISIT SF MDM: CPT

## 2025-04-12 NOTE — ED PROVIDER NOTES
Encounter Date: 4/12/2025       History     Chief Complaint   Patient presents with    exposure to covid     Pt ambulated to ED with c/o exposure to covid, states girlfriend was diagnosed yesterday with covid. Denies any s/s, did not test with OTC. States just wants to get checked.     A 54 y.o. male patient with a history of GERD, HTN, depression, anxiety presents to the ED for COVID-19 screening.  Patient states his girlfriend tested positive for COVID yesterday.  He is requesting COVID testing although he is asymptomatic.  He states he just wants to get checked.  Did not take an over-the-counter test.      The history is provided by the patient.     Review of patient's allergies indicates:  No Known Allergies  Past Medical History:   Diagnosis Date    Anxiety disorder, unspecified     Cervical dystonia     Depression     GERD (gastroesophageal reflux disease)     Hypertension      Past Surgical History:   Procedure Laterality Date    ANKLE SURGERY Right     KNEE SURGERY Left     neck fusion N/A      Family History   Problem Relation Name Age of Onset    Hypertension Father      Hypertension Mother       Social History[1]  Review of Systems   Constitutional:  Negative for chills and fever.   Eyes:  Negative for visual disturbance.   Respiratory:  Negative for shortness of breath.    Cardiovascular:  Negative for chest pain.   Gastrointestinal:  Negative for nausea and vomiting.   Genitourinary:  Negative for difficulty urinating and dysuria.   Musculoskeletal:  Negative for arthralgias.   Skin:  Negative for color change and rash.   Neurological:  Negative for weakness and headaches.   Hematological:  Does not bruise/bleed easily.   Psychiatric/Behavioral:  Negative for confusion.    All other systems reviewed and are negative.      Physical Exam     Initial Vitals [04/12/25 0809]   BP Pulse Resp Temp SpO2   123/74 76 20 97.7 °F (36.5 °C) 100 %      MAP       --         Physical Exam    Nursing note and vitals  reviewed.  Constitutional: He appears well-developed and well-nourished.   HENT:   Head: Normocephalic and atraumatic.   Right Ear: Tympanic membrane, external ear and ear canal normal.   Left Ear: Tympanic membrane, external ear and ear canal normal.   Nose: Nose normal. No rhinorrhea or sinus tenderness. Mouth/Throat: Uvula is midline, oropharynx is clear and moist and mucous membranes are normal. No oropharyngeal exudate or posterior oropharyngeal erythema.   Eyes: Conjunctivae and EOM are normal.   Neck: Neck supple.   Cardiovascular:  Normal rate, regular rhythm and normal heart sounds.     Exam reveals no gallop and no friction rub.       No murmur heard.  Pulmonary/Chest: Breath sounds normal. No respiratory distress. He has no wheezes. He has no rhonchi. He has no rales. He exhibits no tenderness.   Abdominal: He exhibits no distension.   Musculoskeletal:      Cervical back: Neck supple.     Neurological: He is alert and oriented to person, place, and time. GCS eye subscore is 4. GCS verbal subscore is 5. GCS motor subscore is 6.   Skin: Skin is warm and dry. Capillary refill takes less than 2 seconds.         ED Course   Procedures  Labs Reviewed   SARS-COV-2 RNA AMPLIFICATION, QUAL - Normal       Result Value    SARS COV-2 Molecular Negative      Narrative:     The IDNOW COVID-19 assay is a rapid molecular in vitro diagnostic test utilizing an isothermal nucleic acid amplification technology intended for the qualitative detection of nucleic acid from the SARS-CoV-2 viral RNA in direct nasal, nasopharyngeal or throat swabs from individuals who are suspected of COVID-19 by their healthcare provider.          Imaging Results    None          Medications - No data to display  Medical Decision Making  A 54 y.o. male patient with a history of GERD, HTN, depression, anxiety presents to the ED for COVID-19 screening.  Patient states his girlfriend tested positive for COVID yesterday.  He is requesting COVID  testing although he is asymptomatic.  He states he just wants to get checked.  Did not take an over-the-counter test.      COVID-19 test negative.     Clinical impression:  Encounter for screening for COVID-19 (Primary)    Patient is non-toxic appearing and tolerating nutritional intake. Patient's vital signs and clinical condition are stable for DC with ED Prescriptions    None        Follow-up: PCP or Internal medicine clinic within 3 days  Referrals made: none    Strict follow-up precautions given. Patient verbalizes understanding of treatment plan and ED return precautions.         Amount and/or Complexity of Data Reviewed  Labs:  Decision-making details documented in ED Course.    Risk  Risk Details: Given strict ED return precautions. I have spoken with the patient and/or caregivers. I have explained the patient's condition, diagnoses and treatment plan based on the information available to me at this time. I have answered the patient's and/or caregiver's questions and addressed any concerns. The patient and/or caregivers have as good an understanding of the patient's diagnosis, condition and treatment plan as can be expected at this point. The patient's condition is stable and appropriate for discharge from the emergency department.      The patient will pursue further outpatient evaluation with the primary care physician or other designated or consulting physician as outlined in the discharge instructions. The patient and/or caregivers are agreeable to this plan of care and follow-up instructions have been explained in detail. The patient and/or caregivers have received these instructions in written format and have expressed an understanding of the discharge instructions. The patient and/or caregivers are aware that any significant change in condition or worsening of symptoms should prompt an immediate return to this or the closest emergency department or a call to 911.               Additional MDM:    Differential Diagnosis:   Other: The following diagnoses were also considered and will be evaluated: medication refill, medical screening exam and medical evaluation.            ED Course as of 04/12/25 0935   Sat Apr 12, 2025   0932 SARS COV-2 MOLECULAR: Negative [AG]      ED Course User Index  [AG] Pura Ortega PA                           Clinical Impression:  Final diagnoses:  [Z11.52] Encounter for screening for COVID-19 (Primary)          ED Disposition Condition    Discharge Stable          ED Prescriptions    None       Follow-up Information       Follow up With Specialties Details Why Contact Info    [facility] Emergency Dept Emergency Medicine Go to  If symptoms worsen, As needed [address] [facility] [address] [phone]    Seun Murillo MD Family Medicine In 3 days  [address] [place] [id number] [phone]                 [1]   Social History  Tobacco Use    Smoking status: Never    Smokeless tobacco: Never   Substance Use Topics    Alcohol use: Not Currently    Drug use: Yes     Types: Marijuana     Comment: Medical        Pura Ortega PA  04/12/25 0959

## 2025-04-12 NOTE — DISCHARGE INSTRUCTIONS
COVID-19 test was negative.     It is important that you follow up with your primary care provider or specialist if indicated for further evaluation, workup, and treatment as necessary. The exam and treatment you received in Emergency Department was for an urgent problem and NOT INTENDED AS COMPLETE CARE. It is important that you FOLLOW UP with a doctor for ongoing care. If your symptoms become WORSE or you DO NOT IMPROVE and you are unable to reach your health care provider, you should RETURN to the Emergency Department. The Emergency Department provider has provided a PRELIMINARY INTERPRETATION of all your studies. A final interpretation may be done after you are discharged. If a change in your diagnosis or treatment is needed WE WILL CONTACT YOU. It is critical that we have a CURRENT PHONE NUMBER FOR YOU.